# Patient Record
Sex: FEMALE | Race: WHITE | NOT HISPANIC OR LATINO | Employment: PART TIME | URBAN - METROPOLITAN AREA
[De-identification: names, ages, dates, MRNs, and addresses within clinical notes are randomized per-mention and may not be internally consistent; named-entity substitution may affect disease eponyms.]

---

## 2017-03-26 ENCOUNTER — OFFICE VISIT (OUTPATIENT)
Dept: URGENT CARE | Facility: CLINIC | Age: 47
End: 2017-03-26

## 2017-03-26 PROCEDURE — 99203 OFFICE O/P NEW LOW 30 MIN: CPT

## 2022-12-12 ENCOUNTER — OFFICE VISIT (OUTPATIENT)
Dept: FAMILY MEDICINE CLINIC | Facility: CLINIC | Age: 52
End: 2022-12-12

## 2022-12-12 VITALS
HEIGHT: 65 IN | HEART RATE: 73 BPM | SYSTOLIC BLOOD PRESSURE: 164 MMHG | DIASTOLIC BLOOD PRESSURE: 92 MMHG | BODY MASS INDEX: 25.99 KG/M2 | OXYGEN SATURATION: 98 % | WEIGHT: 156 LBS | TEMPERATURE: 96.8 F

## 2022-12-12 DIAGNOSIS — Z12.11 SCREENING FOR COLORECTAL CANCER: ICD-10-CM

## 2022-12-12 DIAGNOSIS — Z13.1 SCREENING FOR DIABETES MELLITUS: ICD-10-CM

## 2022-12-12 DIAGNOSIS — Z13.220 SCREENING FOR LIPID DISORDERS: ICD-10-CM

## 2022-12-12 DIAGNOSIS — Z11.4 SCREENING FOR HIV (HUMAN IMMUNODEFICIENCY VIRUS): ICD-10-CM

## 2022-12-12 DIAGNOSIS — Z12.31 ENCOUNTER FOR SCREENING MAMMOGRAM FOR MALIGNANT NEOPLASM OF BREAST: ICD-10-CM

## 2022-12-12 DIAGNOSIS — Z76.89 ENCOUNTER TO ESTABLISH CARE: Primary | ICD-10-CM

## 2022-12-12 DIAGNOSIS — Z12.12 SCREENING FOR COLORECTAL CANCER: ICD-10-CM

## 2022-12-12 DIAGNOSIS — F43.21 GRIEF AT LOSS OF CHILD: ICD-10-CM

## 2022-12-12 DIAGNOSIS — Z13.29 SCREENING FOR THYROID DISORDER: ICD-10-CM

## 2022-12-12 DIAGNOSIS — Z11.59 NEED FOR HEPATITIS C SCREENING TEST: ICD-10-CM

## 2022-12-12 DIAGNOSIS — Z23 ENCOUNTER FOR IMMUNIZATION: ICD-10-CM

## 2022-12-12 DIAGNOSIS — I73.00 RAYNAUD'S PHENOMENON WITHOUT GANGRENE: ICD-10-CM

## 2022-12-12 DIAGNOSIS — Z13.0 SCREENING FOR DEFICIENCY ANEMIA: ICD-10-CM

## 2022-12-12 DIAGNOSIS — Z13.31 POSITIVE DEPRESSION SCREENING: ICD-10-CM

## 2022-12-12 DIAGNOSIS — F33.1 MODERATE EPISODE OF RECURRENT MAJOR DEPRESSIVE DISORDER (HCC): ICD-10-CM

## 2022-12-12 DIAGNOSIS — Z63.4 GRIEF AT LOSS OF CHILD: ICD-10-CM

## 2022-12-12 SDOH — SOCIAL STABILITY - SOCIAL INSECURITY: DISSAPEARANCE AND DEATH OF FAMILY MEMBER: Z63.4

## 2022-12-12 NOTE — PATIENT INSTRUCTIONS
Blood Pressure Diary    Check blood pressures at home and keep a log  Bring log to your follow up or call if the average home BP is greater than 748 systolic and or 90 diastolic before your follow up  Prasanna Landaverde   Tues  Wed Thurs Fri Sat  COMMENTS                                                                            Keep a log of your blood pressure readings at home  Please take blood pressure at same time of day  Please record date and time blood pressure was taken  Make sure to take your blood pressure when you are seated and resting for about 3 minutes  Please call office if Blood Pressure is <110/<60  You can send these to me via Gnodal or by calling the office  This will help me manage your blood pressure better  Apps and Websites for Counseling, Anxiety/Depression, Chronic Pain, Lifestyle Change, Problem-solving, Self-Esteem, Anger Management, Coping with Uncertainty    [address] [facility]: [phone]    [facility]: [phone]    Greg Lewis and [address] [place] [address]: [phone]    [employer] Phone Line: [phone] [address]: [phone] or [phone]    (Prices current as of 9/5/19)    Mood Kit - Available in [facility] for $4 99  Supports a person's success in specific situations, includes thought , mood tracker, and journal   Can be accessed in an unstructured way and used as an unguided self-help abdulkadir  2   Moodnotes - Available in [employer] for $4 99  Focuses on healthier thinking habits and identifying "traps" in thinking  Tracks mood over a period of time and identifies factors that influence mood  3   MoodMission - Available in [facility] for free  Includes five "missions" to promote confidence in handling stressors and coping in specific situations  The abdulkadir personalizes its style and techniques based on when the user engages it most frequently    In-abdulkadir rewards are used to motivate, increase fun and self-confidence  Helpful for patients who need improvement in mood or a decrease in anxiety and depression symptoms  4    What's Up - Available in [employer] for free  Recognizes negative thinking patterns and methods to overcome them  Uses helpful metaphors, a catastrophe scale, grounding techniques, and breathing exercises  Has the ability to sync data across multiple devices and protect this information with a unique pass code  Has the capability to be active in forums where people can discuss similar feelings and strategies that have been helpful  5   Moodpath - Available in [employer] for free  Uses daily screenings to create a better understanding of thoughts, feelings, and emotions  When needed, it provides a discussion guide to talking with a medical professional based on the responses to daily screenings  Includes over 150 psychological exercises and videos to promote and strengthen overall mental health  6   MindShift - Available in [employer] for [employer]IR[employer]IC  Helpful for youth and young adults in coping with anxiety  Creates an individualized toolbox to help users deal with test anxiety, perfectionism, social anxiety, worry, panic, and conflict  Includes directions on how to construct “belief experiments” to trial common beliefs that feed anxiety, guided relaxation, as well as tools and tips to help establish and accomplish goals  Differentiates between helpful and unhelpful anxiety, and explains how to overcome fears by gradually facing them in manageable steps  7   CBT-I  - Available in [employer]  For insomnia  Educates about sleep, developing positive sleep routines, and improved sleep environment  Encourages user to change behaviors which will provide confidence for better sleep on a regular basis  8   Yipit  - Free website  Provides self-help and therapy resources that encourages change to combat negative and destructive thought patterns  Includes access to numerous handouts on a variety of symptoms related to anxiety, depression, low self-esteem, panic attacks, social disorders, and more  The solution section has interventions that can be printed and saved for future use  9   Woebot - Available in [employer] for free  Recommended for age 16+  Friendly self-care  that helps you think through situations with step-by-step guidance using counseling tools, learn about yourself with intelligent mood tracking, and master skills to reduce stress and live happier through 100+ evidence-based stories from clinicians  Chat as often or as little as you'd like, whenever you'd like to  10   Mark:  EULALIA  CBT DBT Chatbot - Available in Apple appsstore and Google Play for free, has in-abdulkadir purchases  Recommended for 12+  Psychologist support at $30/month, 50% off to start  Justyn Christiansen / [employer] is free  Uses techniques of CBT, DBT, yoga, and meditation to support your needs regarding stress, anxiety, sleep, loss, and a full range of other mental health and wellness issues  11   Curable Pain Relief - Available in [employer] Abdulkadir store and Google Play for free, has in-abdulkadir purchases  Teaches about the chronic pain cycle and how to reverse it, while retraining your brain and nervous system for long-term results  Smart  Sarah guides user through updates in pain science to identify, target, eliminate the factors that are keeping user "stuck" in the pain cycle  12   [facility] - Available in [employer] Abd[employer]adir store and Google Play for a fee  Subscription service, starting at $59/week (billed monthly)  All plans include unlimited messaging, and add live video sessions if desired  Unlimited messaging therapy for teens ages 15-14 and special services for couples therapy    You can change therapists or stop subscription renewal at any time  Recommended for 13+  User is matched with a licensed therapist in your state and communicate on your device by text, audio, and video from anywhere, at any time  User will hear back at least once a day, 5 days per week

## 2022-12-12 NOTE — PROGRESS NOTES
Assessment/Plan:    Problem List Items Addressed This Visit     Raynaud's phenomenon without gangrene   Other Visit Diagnoses     Encounter to establish care    -  Primary    Encounter for immunization        Relevant Orders    Tdap Vaccine greater than or equal to 6yo (Completed)    Grief at loss of child        Relevant Orders    Ambulatory Referral to Psychology    Moderate episode of recurrent major depressive disorder ([address] )        Relevant Medications    sertraline (ZOLOFT) 50 mg tablet    Screening for deficiency anemia        Relevant Orders    CBC and differential    Screening for thyroid disorder        Relevant Orders    TSH, 3rd generation with Free T4 reflex    Screening for lipid disorders        Relevant Orders    Lipid Panel with Direct LDL reflex    Screening for diabetes mellitus        Relevant Orders    Comprehensive metabolic panel    Screening for colorectal cancer        Relevant Orders    Ambulatory referral for colonoscopy    Encounter for screening mammogram for malignant neoplasm of breast        Relevant Orders    Mammo screening bilateral w 3d & cad    Need for hepatitis C screening test        Relevant Orders    Hepatitis C antibody    Screening for HIV (human immunodeficiency virus)        Relevant Orders    HIV 1/2 Antigen/Antibody (4th Generation) w Reflex [facility]    Positive depression screening        Relevant Orders    Ambulatory Referral to Psychology           Diagnoses and all orders for this visit:    Encounter to establish care    Encounter for immunization  -     Tdap Vaccine greater than or equal to 6yo    Grief at loss of child  -     Ambulatory Referral to Psychology; Future    Moderate episode of recurrent major depressive disorder (HCC)  -     sertraline (ZOLOFT) 50 mg tablet;  Take 1 tablet (50 mg total) by mouth daily    Screening for deficiency anemia  -     CBC and differential; Future    Screening for thyroid disorder  -     TSH, 3rd generation with Free T4 reflex; Future    Screening for lipid disorders  -     Lipid Panel with Direct LDL reflex; Future    Screening for diabetes mellitus  -     Comprehensive metabolic panel; Future    Screening for colorectal cancer  -     Ambulatory referral for colonoscopy; Future    Encounter for screening mammogram for malignant neoplasm of breast  -     Mammo screening bilateral w 3d & cad; Future    Need for hepatitis C screening test  -     Hepatitis C antibody; Future    Screening for HIV (human immunodeficiency virus)  -     HIV 1/2 Antigen/Antibody (4th Generation) w Reflex [facility]; Future    Raynaud's phenomenon without gangrene    Positive depression screening  -     Ambulatory Referral to Psychology; Future      No problem-specific Assessment & Plan notes found for this encounter  Subjective:      Patient ID: Tarik Terrazas is a 47 y o  female  NP with PMHx raynaud's syndrome presents to establish care  PHQ is elevated, Pt states she lost her son 6-8 years ago and in July lost her father  She endorses she is easily irritated, cries often, and difficulty concentrating at times  Pt BP is noted to be elevated  Pt will record BP at home and if it doesn't trend down, she will advise and is agreeable to starting BP lowering agent  Depression  This is a chronic problem  Associated symptoms comments: Crying, easily upset, irritable,   The symptoms are aggravated by stress  She has tried nothing for the symptoms  The treatment provided no relief  The following portions of the patient's history were reviewed and updated as appropriate:   She has no past medical history on file  ,  does not have any pertinent problems on file  ,   has a past surgical history that includes  section  ,  family history includes Breast cancer in her mother; Depression in her mother; Heart attack in her father; Heart disease in her father; Hyperlipidemia in her father; Hypertension in her father and mother; Kidney disease in her father; Stroke in her father; Uterine cancer in her mother  ,   reports that she has been smoking cigarettes  She started smoking about 32 years ago  She has a 30 00 pack-year smoking history  She has never used smokeless tobacco  She reports current alcohol use  She reports that she does not use drugs  ,  has No Known Allergies     Current Outpatient Medications   Medication Sig Dispense Refill   • sertraline (ZOLOFT) 50 mg tablet Take 1 tablet (50 mg total) by mouth daily 30 tablet 1     No current facility-administered medications for this visit  BMI Counseling: Body mass index is 25 96 kg/m²  The BMI is above normal  Nutrition recommendations include decreasing portion sizes, consuming healthier snacks, limiting drinks that contain sugar, moderation in carbohydrate intake, increasing intake of lean protein, reducing intake of saturated and trans fat and reducing intake of cholesterol  Exercise recommendations include exercising 3-5 times per week  No pharmacotherapy was ordered  Rationale for BMI follow-up plan is due to patient being overweight or obese  Depression Screening and Follow-up Plan: Patient's depression screening was positive with a PHQ-2 score of 6  Their PHQ-9 score was 17  Patient assessed for underlying major depression  Brief counseling provided and recommend additional follow-up/re-evaluation next office visit  Continue regular follow-up with their mental health provider who is managing their mental health condition(s)  Has been going on for 6-8 years due to losing son 6 years ago and father this past July    Tobacco Cessation Counseling: Tobacco cessation counseling was provided  The patient is sincerely urged to quit consumption of tobacco  She is not ready to quit tobacco  Medication options and side effects of medication discussed  Patient refused medication  Review of Systems   Psychiatric/Behavioral: Positive for agitation, decreased concentration and depression   Negative for suicidal ideas  Easily irritated   All other systems reviewed and are negative  Objective:  Vitals:    12/12/22 0918 12/12/22 0953   BP: 170/98 164/92   BP Location: Left arm    Patient Position: Sitting    Cuff Size: Adult    Pulse: 73    Temp: (!) 96 8 °F (36 °C)    TempSrc: Tympanic    SpO2: 98%    Weight: 70 8 kg (156 lb)    Height: 5' 5" (1 651 m)      Body mass index is 25 96 kg/m²  Physical Exam  Vitals and nursing note reviewed  Constitutional:       Appearance: Normal appearance  She is well-developed  Interventions: Face mask in place  HENT:      Head: Normocephalic and atraumatic  Right Ear: Tympanic membrane, ear canal and external ear normal       Left Ear: Tympanic membrane, ear canal and external ear normal       Nose: Nose normal       Mouth/Throat:      Mouth: Mucous membranes are moist       Pharynx: Uvula midline  Eyes:      General: Lids are normal       Conjunctiva/sclera: Conjunctivae normal       Pupils: Pupils are equal, round, and reactive to light  Neck:      Thyroid: No thyroid mass or thyromegaly  Vascular: No JVD  Trachea: Trachea and phonation normal    Cardiovascular:      Rate and Rhythm: Normal rate and regular rhythm  Pulses: Normal pulses  Heart sounds: Normal heart sounds, S1 normal and S2 normal  No murmur heard  No friction rub  No gallop  Pulmonary:      Effort: Pulmonary effort is normal       Breath sounds: Normal breath sounds  Abdominal:      General: Bowel sounds are normal       Palpations: Abdomen is soft  Tenderness: There is no abdominal tenderness  Genitourinary:     Comments: Deferred   Musculoskeletal:         General: Normal range of motion  Cervical back: Full passive range of motion without pain, normal range of motion and neck supple  Right lower leg: No edema  Left lower leg: No edema     Lymphadenopathy:      Head:      Right side of head: No submental, submandibular, tonsillar, preauricular, posterior auricular or occipital adenopathy  Left side of head: No submental, submandibular, tonsillar, preauricular, posterior auricular or occipital adenopathy  Cervical: No cervical adenopathy  Skin:     General: Skin is warm and dry  Capillary Refill: Capillary refill takes less than 2 seconds  Neurological:      General: No focal deficit present  Mental Status: She is alert and oriented to person, place, and time  Cranial Nerves: No cranial nerve deficit  Sensory: Sensation is intact  Motor: Motor function is intact  Coordination: Coordination is intact  Gait: Gait is intact  Deep Tendon Reflexes: Reflexes are normal and symmetric  Psychiatric:         Attention and Perception: Attention and perception normal          Mood and Affect: Mood and affect normal          Speech: Speech normal          Behavior: Behavior normal  Behavior is cooperative  Thought Content:  Thought content normal          Cognition and Memory: Cognition normal          Judgment: Judgment normal

## 2023-01-10 ENCOUNTER — APPOINTMENT (OUTPATIENT)
Dept: LAB | Facility: CLINIC | Age: 53
End: 2023-01-10

## 2023-01-10 DIAGNOSIS — Z11.4 SCREENING FOR HIV (HUMAN IMMUNODEFICIENCY VIRUS): ICD-10-CM

## 2023-01-10 DIAGNOSIS — Z13.0 SCREENING FOR DEFICIENCY ANEMIA: ICD-10-CM

## 2023-01-10 DIAGNOSIS — Z13.29 SCREENING FOR THYROID DISORDER: ICD-10-CM

## 2023-01-10 DIAGNOSIS — Z11.59 NEED FOR HEPATITIS C SCREENING TEST: ICD-10-CM

## 2023-01-10 DIAGNOSIS — Z13.220 SCREENING FOR LIPID DISORDERS: ICD-10-CM

## 2023-01-10 DIAGNOSIS — Z13.1 SCREENING FOR DIABETES MELLITUS: ICD-10-CM

## 2023-01-10 LAB
ALBUMIN SERPL BCP-MCNC: 3.7 G/DL (ref 3.5–5)
ALP SERPL-CCNC: 63 U/L (ref 46–116)
ALT SERPL W P-5'-P-CCNC: 17 U/L (ref 12–78)
ANION GAP SERPL CALCULATED.3IONS-SCNC: 3 MMOL/L (ref 4–13)
AST SERPL W P-5'-P-CCNC: 9 U/L (ref 5–45)
BASOPHILS # BLD AUTO: 0.06 THOUSANDS/ÂΜL (ref 0–0.1)
BASOPHILS NFR BLD AUTO: 1 % (ref 0–1)
BILIRUB SERPL-MCNC: 0.54 MG/DL (ref 0.2–1)
BUN SERPL-MCNC: 7 MG/DL (ref 5–25)
CALCIUM SERPL-MCNC: 9 MG/DL (ref 8.3–10.1)
CHLORIDE SERPL-SCNC: 109 MMOL/L (ref 96–108)
CHOLEST SERPL-MCNC: 159 MG/DL
CO2 SERPL-SCNC: 28 MMOL/L (ref 21–32)
CREAT SERPL-MCNC: 0.7 MG/DL (ref 0.6–1.3)
EOSINOPHIL # BLD AUTO: 0.17 THOUSAND/ÂΜL (ref 0–0.61)
EOSINOPHIL NFR BLD AUTO: 2 % (ref 0–6)
ERYTHROCYTE [DISTWIDTH] IN BLOOD BY AUTOMATED COUNT: 13.4 % (ref 11.6–15.1)
GFR SERPL CREATININE-BSD FRML MDRD: 100 ML/MIN/1.73SQ M
GLUCOSE P FAST SERPL-MCNC: 86 MG/DL (ref 65–99)
HCT VFR BLD AUTO: 47.5 % (ref 34.8–46.1)
HCV AB SER QL: NORMAL
HDLC SERPL-MCNC: 31 MG/DL
HGB BLD-MCNC: 14.8 G/DL (ref 11.5–15.4)
IMM GRANULOCYTES # BLD AUTO: 0.02 THOUSAND/UL (ref 0–0.2)
IMM GRANULOCYTES NFR BLD AUTO: 0 % (ref 0–2)
LDLC SERPL CALC-MCNC: 102 MG/DL (ref 0–100)
LYMPHOCYTES # BLD AUTO: 2.19 THOUSANDS/ÂΜL (ref 0.6–4.47)
LYMPHOCYTES NFR BLD AUTO: 29 % (ref 14–44)
MCH RBC QN AUTO: 29.3 PG (ref 26.8–34.3)
MCHC RBC AUTO-ENTMCNC: 31.2 G/DL (ref 31.4–37.4)
MCV RBC AUTO: 94 FL (ref 82–98)
MONOCYTES # BLD AUTO: 0.51 THOUSAND/ÂΜL (ref 0.17–1.22)
MONOCYTES NFR BLD AUTO: 7 % (ref 4–12)
NEUTROPHILS # BLD AUTO: 4.62 THOUSANDS/ÂΜL (ref 1.85–7.62)
NEUTS SEG NFR BLD AUTO: 61 % (ref 43–75)
NRBC BLD AUTO-RTO: 0 /100 WBCS
PLATELET # BLD AUTO: 333 THOUSANDS/UL (ref 149–390)
PMV BLD AUTO: 9.8 FL (ref 8.9–12.7)
POTASSIUM SERPL-SCNC: 4.1 MMOL/L (ref 3.5–5.3)
PROT SERPL-MCNC: 6.7 G/DL (ref 6.4–8.4)
RBC # BLD AUTO: 5.05 MILLION/UL (ref 3.81–5.12)
SODIUM SERPL-SCNC: 140 MMOL/L (ref 135–147)
TRIGL SERPL-MCNC: 132 MG/DL
TSH SERPL DL<=0.05 MIU/L-ACNC: 0.9 UIU/ML (ref 0.45–4.5)
WBC # BLD AUTO: 7.57 THOUSAND/UL (ref 4.31–10.16)

## 2023-01-11 LAB — HIV 1+2 AB+HIV1 P24 AG SERPL QL IA: NORMAL

## 2023-01-17 ENCOUNTER — OFFICE VISIT (OUTPATIENT)
Dept: FAMILY MEDICINE CLINIC | Facility: CLINIC | Age: 53
End: 2023-01-17

## 2023-01-17 VITALS
WEIGHT: 152 LBS | HEIGHT: 65 IN | OXYGEN SATURATION: 98 % | SYSTOLIC BLOOD PRESSURE: 154 MMHG | HEART RATE: 78 BPM | DIASTOLIC BLOOD PRESSURE: 92 MMHG | BODY MASS INDEX: 25.33 KG/M2 | TEMPERATURE: 96.4 F

## 2023-01-17 DIAGNOSIS — Z12.2 ENCOUNTER FOR SCREENING FOR LUNG CANCER: ICD-10-CM

## 2023-01-17 DIAGNOSIS — Z00.01 ABNORMAL WELLNESS EXAM: Primary | ICD-10-CM

## 2023-01-17 DIAGNOSIS — Z12.31 ENCOUNTER FOR SCREENING MAMMOGRAM FOR MALIGNANT NEOPLASM OF BREAST: ICD-10-CM

## 2023-01-17 DIAGNOSIS — F17.210 SMOKING GREATER THAN 20 PACK YEARS: ICD-10-CM

## 2023-01-17 DIAGNOSIS — Z12.11 SCREEN FOR COLON CANCER: ICD-10-CM

## 2023-01-17 DIAGNOSIS — I10 PRIMARY HYPERTENSION: ICD-10-CM

## 2023-01-17 DIAGNOSIS — F33.1 MODERATE EPISODE OF RECURRENT MAJOR DEPRESSIVE DISORDER (HCC): ICD-10-CM

## 2023-01-17 RX ORDER — OLMESARTAN MEDOXOMIL 5 MG/1
5 TABLET ORAL DAILY
Qty: 30 TABLET | Refills: 1 | Status: SHIPPED | OUTPATIENT
Start: 2023-01-17

## 2023-01-17 NOTE — PROGRESS NOTES
[facility]    NAME: Brigid Galindo  AGE: 47 y o  SEX: female  : 1970     DATE: 2023     Assessment and Plan:     Problem List Items Addressed This Visit     Smoking greater than 20 pack years    Relevant Orders    CT lung screening program    Primary hypertension    Relevant Medications    olmesartan (BENICAR) 5 mg tablet    Moderate episode of recurrent major depressive disorder (HCC)    Relevant Medications    sertraline (ZOLOFT) 50 mg tablet   Other Visit Diagnoses     Abnormal wellness exam    -  Primary    Encounter for screening for lung cancer        Relevant Orders    CT lung screening program    Encounter for screening mammogram for malignant neoplasm of breast        Screen for colon cancer        Relevant Orders    Ambulatory referral for colonoscopy          Immunizations and preventive care screenings were discussed with patient today  Appropriate education was printed on patient's after visit summary  Counseling:  Alcohol/drug use: discussed moderation in alcohol intake, the recommendations for healthy alcohol use, and avoidance of illicit drug use  Dental Health: discussed importance of regular tooth brushing, flossing, and dental visits  Injury prevention: discussed safety/seat belts, safety helmets, smoke detectors, carbon dioxide detectors, and smoking near bedding or upholstery  Sexual health: discussed sexually transmitted diseases, partner selection, use of condoms, avoidance of unintended pregnancy, and contraceptive alternatives  Exercise: the importance of regular exercise/physical activity was discussed  Recommend exercise 3-5 times per week for at least 30 minutes  BMI Counseling: Body mass index is 25 29 kg/m²   The BMI is above normal  Nutrition recommendations include decreasing portion sizes, consuming healthier snacks, limiting drinks that contain sugar, moderation in carbohydrate intake, increasing intake of lean protein, reducing intake of saturated and trans fat and reducing intake of cholesterol  Exercise recommendations include exercising 3-5 times per week  No pharmacotherapy was ordered  Rationale for BMI follow-up plan is due to patient being overweight or obese  Return in about 1 month (around 2/17/2023)  Chief Complaint:     Chief Complaint   Patient presents with   • Physical Exam     annual      History of Present Illness:     Adult Annual Physical   Patient here for a comprehensive physical exam  The patient reports no problems  Hypertension  This is a chronic problem  The problem is uncontrolled  There are no associated agents to hypertension  Risk factors for coronary artery disease include post-menopausal state  Past treatments include angiotensin blockers (start)  There is no history of angina or CAD/MI  There is no history of chronic renal disease, hyperaldosteronism, hyperparathyroidism, a hypertension causing med, renovascular disease or a thyroid problem  Depression  This is a chronic problem  The symptoms are aggravated by stress  Treatments tried: started sertraline 12/2022  The treatment provided moderate relief  Diet and Physical Activity  Diet/Nutrition: well balanced diet  Exercise: no formal exercise  Depression Screening  PHQ-2/9 Depression Screening         General Health  Sleep: gets 7-8 hours of sleep on average  Hearing: normal - bilateral   Vision: no vision problems and most recent eye exam >1 year ago  Dental: no dental visits for >1 year  /GYN Health  Patient is: postmenopausal  Last menstrual period: 2022  Review of Systems:     Review of Systems   Psychiatric/Behavioral: Positive for depression  All other systems reviewed and are negative  Past Medical History:     History reviewed  No pertinent past medical history     Past Surgical History:     Past Surgical History:   Procedure Laterality Date   •  SECTION      2-  and       Social History:     Social History     Socioeconomic History   • Marital status: Single     Spouse name: None   • Number of children: None   • Years of education: None   • Highest education level: None   Occupational History   • None   Tobacco Use   • Smoking status: Every Day     Packs/day: 1 00     Years: 30 00     Pack years: 30 00     Types: Cigarettes     Start date:    • Smokeless tobacco: Never   Vaping Use   • Vaping Use: Never used   Substance and Sexual Activity   • Alcohol use: Yes     Comment: socially-very rare   • Drug use: Never   • Sexual activity: None   Other Topics Concern   • None   Social History Narrative   • None     Social Determinants of Health     Financial Resource Strain: Not on file   Food Insecurity: Not on file   Transportation Needs: Not on file   Physical Activity: Not on file   Stress: Not on file   Social Connections: Not on file   Intimate Partner Violence: Not on file   Housing Stability: Not on file      Family History:     Family History   Problem Relation Age of Onset   • Hypertension Mother    • Depression Mother    • Breast cancer Mother    • Uterine cancer Mother    • Hyperlipidemia Father    • Hypertension Father    • Kidney disease Father    • Heart attack Father    • Stroke Father    • Heart disease Father       Current Medications:     Current Outpatient Medications   Medication Sig Dispense Refill   • olmesartan (BENICAR) 5 mg tablet Take 1 tablet (5 mg total) by mouth daily 30 tablet 1   • sertraline (ZOLOFT) 50 mg tablet Take 1 tablet (50 mg total) by mouth daily 90 tablet 1     No current facility-administered medications for this visit        Allergies:     No Known Allergies   Physical Exam:     /92 (BP Location: Left arm, Patient Position: Sitting, Cuff Size: Adult)   Pulse 78   Temp (!) 96 4 °F (35 8 °C) (Tympanic)   Ht 5' 5" (1 651 m)   Wt 68 9 kg (152 lb)   SpO2 98%   BMI 25 29 kg/m² Physical Exam  Vitals and nursing note reviewed  Constitutional:       Appearance: Normal appearance  She is well-developed  HENT:      Head: Normocephalic and atraumatic  Right Ear: Tympanic membrane, ear canal and external ear normal       Left Ear: Tympanic membrane, ear canal and external ear normal       Nose: Nose normal       Mouth/Throat:      Mouth: Mucous membranes are moist    Eyes:      General: Lids are normal  Vision grossly intact  Conjunctiva/sclera: Conjunctivae normal       Pupils: Pupils are equal, round, and reactive to light  Cardiovascular:      Rate and Rhythm: Normal rate and regular rhythm  Pulses: Normal pulses  Heart sounds: Normal heart sounds, S1 normal and S2 normal      No friction rub  No gallop  No S3 sounds  Pulmonary:      Effort: Pulmonary effort is normal       Breath sounds: Normal breath sounds  Abdominal:      General: Bowel sounds are normal       Palpations: Abdomen is soft  Tenderness: There is no abdominal tenderness  Genitourinary:     Comments: Deferred  Musculoskeletal:         General: Normal range of motion  Cervical back: Normal range of motion and neck supple  Right lower leg: No edema  Left lower leg: No edema  Lymphadenopathy:      Cervical: No cervical adenopathy  Skin:     General: Skin is warm and dry  Capillary Refill: Capillary refill takes less than 2 seconds  Neurological:      General: No focal deficit present  Mental Status: She is alert and oriented to person, place, and time  Motor: Motor function is intact  Gait: Gait is intact  Psychiatric:         Attention and Perception: Attention and perception normal          Mood and Affect: Mood and affect normal          Speech: Speech normal          Behavior: Behavior normal  Behavior is cooperative  Thought Content:  Thought content normal          Cognition and Memory: Cognition and memory normal          Judgment: Judgment normal           Appointment on 01/10/2023   Component Date Value Ref Range Status   • Cholesterol 01/10/2023 159  See Comment mg/dL Final    Cholesterol:         Pediatric <18 Years        Desirable          <170 mg/dL      Borderline High    170-199 mg/dL      High               >=200 mg/dL        Adult >=18 Years            Desirable         <200 mg/dL      Borderline High   200-239 mg/dL      High              >239 mg/dL     • Triglycerides 01/10/2023 132  See Comment mg/dL Final    Triglyceride:     0-9Y            <75mg/dL     10Y-17Y         <90 mg/dL       >=18Y     Normal          <150 mg/dL     Borderline High 150-199 mg/dL     High            200-499 mg/dL        Very High       >499 mg/dL    Specimen collection should occur prior to N-Acetylcysteine or Metamizole administration due to the potential for falsely depressed results  • HDL, Direct 01/10/2023 31 (L)  >=50 mg/dL Final   • LDL Calculated 01/10/2023 102 (H)  0 - 100 mg/dL Final    This screening LDL is a calculated result  It does not have the accuracy of the Direct Measured LDL in the monitoring of patients with hyperlipidemia and/or statin therapy  Direct Measure LDL (INZ901) must be ordered separately in these patients    LDL Cholesterol:     Optimal           <100 mg/dl     Near Optimal      100-129 mg/dl     Above Optimal       Borderline High 130-159 mg/dl       High            160-189 mg/dl       Very High       >189 mg/dl          • WBC 01/10/2023 7 57  4 31 - 10 16 Thousand/uL Final   • RBC 01/10/2023 5 05  3 81 - 5 12 Million/uL Final   • Hemoglobin 01/10/2023 14 8  11 5 - 15 4 g/dL Final   • Hematocrit 01/10/2023 47 5 (H)  34 8 - 46 1 % Final   • MCV 01/10/2023 94  82 - 98 fL Final   • MCH 01/10/2023 29 3  26 8 - 34 3 pg Final   • MCHC 01/10/2023 31 2 (L)  31 4 - 37 4 g/dL Final   • RDW 01/10/2023 13 4  11 6 - 15 1 % Final   • MPV 01/10/2023 9 8  8 9 - 12 7 fL Final   • Platelets 93/39/8394 333  149 - 390 Thousands/uL Final   • nRBC 01/10/2023 0  /100 WBCs Final   • Neutrophils Relative 01/10/2023 61  43 - 75 % Final   • Immat GRANS % 01/10/2023 0  0 - 2 % Final   • Lymphocytes Relative 01/10/2023 29  14 - 44 % Final   • Monocytes Relative 01/10/2023 7  4 - 12 % Final   • Eosinophils Relative 01/10/2023 2  0 - 6 % Final   • Basophils Relative 01/10/2023 1  0 - 1 % Final   • Neutrophils Absolute 01/10/2023 4 62  1 85 - 7 62 Thousands/µL Final   • Immature Grans Absolute 01/10/2023 0 02  0 00 - 0 20 Thousand/uL Final   • Lymphocytes Absolute 01/10/2023 2 19  0 60 - 4 47 Thousands/µL Final   • Monocytes Absolute 01/10/2023 0 51  0 17 - 1 22 Thousand/µL Final   • Eosinophils Absolute 01/10/2023 0 17  0 00 - 0 61 Thousand/µL Final   • Basophils Absolute 01/10/2023 0 06  0 00 - 0 10 Thousands/µL Final   • Sodium 01/10/2023 140  135 - 147 mmol/L Final   • Potassium 01/10/2023 4 1  3 5 - 5 3 mmol/L Final   • Chloride 01/10/2023 109 (H)  96 - 108 mmol/L Final   • CO2 01/10/2023 28  21 - 32 mmol/L Final   • ANION GAP 01/10/2023 3 (L)  4 - 13 mmol/L Final   • BUN 01/10/2023 7  5 - 25 mg/dL Final   • Creatinine 01/10/2023 0 70  0 60 - 1 30 mg/dL Final    Standardized to IDMS reference method   • Glucose, Fasting 01/10/2023 86  65 - 99 mg/dL Final    Specimen collection should occur prior to Sulfasalazine administration due to the potential for falsely depressed results  Specimen collection should occur prior to Sulfapyridine administration due to the potential for falsely elevated results  • Calcium 01/10/2023 9 0  8 3 - 10 1 mg/dL Final   • AST 01/10/2023 9  5 - 45 U/L Final    Specimen collection should occur prior to Sulfasalazine administration due to the potential for falsely depressed results  • ALT 01/10/2023 17  12 - 78 U/L Final    Specimen collection should occur prior to Sulfasalazine and/or Sulfapyridine administration due to the potential for falsely depressed results      • Alkaline Phosphatase 01/10/2023 63  46 - 116 U/L Final   • Total Protein 01/10/2023 6 7  6 4 - 8 4 g/dL Final   • Albumin 01/10/2023 3 7  3 5 - 5 0 g/dL Final   • Total Bilirubin 01/10/2023 0 54  0 20 - 1 00 mg/dL Final    Use of this assay is not recommended for patients undergoing treatment with eltrombopag due to the potential for falsely elevated results  • eGFR 01/10/2023 100  ml/min/1 73sq m Final   • TSH 3RD GENERATON 01/10/2023 0 904  0 450 - 4 500 uIU/mL Final    The recommended reference ranges for TSH during pregnancy are as follows:   First trimester 0 1 to 2 5 uIU/mL   Second trimester  0 2 to 3 0 uIU/mL   Third trimester 0 3 to 3 0 uIU/m    Note: Normal ranges may not apply to patients who are transgender, non-binary, or whose legal sex, sex at birth, and gender identity differ  Adult TSH (3rd generation) reference range follows the recommended guidelines of the [employer] Association, January, 2020  • Hepatitis C Ab 01/10/2023 Non-reactive  Non-reactive Final   • HIV-1/HIV-2 Ab 01/10/2023 Non-Reactive  Non-Reactive Final     I have reviewed all the lab results  There are some abnormalities that are not critical to the patient's health, I have discussed these in person at this office appointment      Charlie Finch [address] [place] [address]

## 2023-02-20 ENCOUNTER — OFFICE VISIT (OUTPATIENT)
Dept: FAMILY MEDICINE CLINIC | Facility: CLINIC | Age: 53
End: 2023-02-20

## 2023-02-20 VITALS
OXYGEN SATURATION: 98 % | HEART RATE: 77 BPM | BODY MASS INDEX: 25.16 KG/M2 | TEMPERATURE: 96.8 F | DIASTOLIC BLOOD PRESSURE: 96 MMHG | SYSTOLIC BLOOD PRESSURE: 158 MMHG | HEIGHT: 65 IN | WEIGHT: 151 LBS

## 2023-02-20 DIAGNOSIS — Z23 NEED FOR PNEUMOCOCCAL VACCINE: ICD-10-CM

## 2023-02-20 DIAGNOSIS — Z23 NEED FOR SHINGLES VACCINE: ICD-10-CM

## 2023-02-20 DIAGNOSIS — I10 PRIMARY HYPERTENSION: Primary | ICD-10-CM

## 2023-02-20 RX ORDER — IRBESARTAN 75 MG/1
75 TABLET ORAL
Qty: 30 TABLET | Refills: 1 | Status: SHIPPED | OUTPATIENT
Start: 2023-02-20

## 2023-02-20 NOTE — PROGRESS NOTES
Assessment/Plan:    Problem List Items Addressed This Visit     Primary hypertension - Primary    Relevant Medications    irbesartan (AVAPRO) 75 mg tablet   Other Visit Diagnoses     Need for shingles vaccine        Relevant Orders    Zoster Vaccine Recombinant IM    Need for pneumococcal vaccine        Relevant Orders    Pneumococcal Conjugate Vaccine 20-valent (Pcv20)           Diagnoses and all orders for this visit:    Primary hypertension  -     irbesartan (AVAPRO) 75 mg tablet; Take 1 tablet (75 mg total) by mouth daily at bedtime    Need for shingles vaccine  -     Zoster Vaccine Recombinant IM    Need for pneumococcal vaccine  -     Pneumococcal Conjugate Vaccine 20-valent (Pcv20)      No problem-specific Assessment & Plan notes found for this encounter  Subjective:      Patient ID: William Anderson is a 47 y o  female  Hypertension  This is a chronic problem  The problem is uncontrolled  Pertinent negatives include no anxiety, chest pain, headaches, orthopnea, palpitations, peripheral edema, PND or shortness of breath  There are no associated agents to hypertension  Risk factors for coronary artery disease include smoking/tobacco exposure  Past treatments include angiotensin blockers  The current treatment provides no improvement (change from benicar to avapro and recheck BP in 1 month)  There are no compliance problems  There is no history of angina or heart failure  There is no history of chronic renal disease, hyperaldosteronism, hyperparathyroidism, a hypertension causing med, renovascular disease or a thyroid problem  The following portions of the patient's history were reviewed and updated as appropriate:   She has no past medical history on file  ,  does not have any pertinent problems on file  ,   has a past surgical history that includes  section  ,  family history includes Breast cancer in her mother; Depression in her mother; Heart attack in her father; Heart disease in her father; Hyperlipidemia in her father; Hypertension in her father and mother; Kidney disease in her father; Stroke in her father; Uterine cancer in her mother  ,   reports that she has been smoking cigarettes  She started smoking about 33 years ago  She has a 30 00 pack-year smoking history  She has never used smokeless tobacco  She reports current alcohol use  She reports that she does not use drugs  ,  has No Known Allergies     Current Outpatient Medications   Medication Sig Dispense Refill   • irbesartan (AVAPRO) 75 mg tablet Take 1 tablet (75 mg total) by mouth daily at bedtime 30 tablet 1   • sertraline (ZOLOFT) 50 mg tablet Take 1 tablet (50 mg total) by mouth daily 90 tablet 1     No current facility-administered medications for this visit  Review of Systems   Respiratory: Negative for shortness of breath  Cardiovascular: Negative for chest pain, palpitations, orthopnea and PND  Neurological: Negative for headaches  All other systems reviewed and are negative  Objective:  Vitals:    02/20/23 0802   BP: 158/96   BP Location: Left arm   Patient Position: Sitting   Cuff Size: Adult   Pulse: 77   Temp: (!) 96 8 °F (36 °C)   TempSrc: Tympanic   SpO2: 98%   Weight: 68 5 kg (151 lb)   Height: 5' 5" (1 651 m)     Body mass index is 25 13 kg/m²  Physical Exam  Vitals and nursing note reviewed  Constitutional:       Appearance: Normal appearance  Cardiovascular:      Rate and Rhythm: Normal rate  Pulses: Normal pulses  Pulmonary:      Effort: Pulmonary effort is normal    Skin:     Capillary Refill: Capillary refill takes less than 2 seconds  Neurological:      General: No focal deficit present  Mental Status: She is alert  Psychiatric:         Mood and Affect: Mood normal            Portions of the record may have been created with voice recognition software   Occasional wrong word or "sound a like" substitutions may have occurred due to the inherent limitations of voice recognition software  Read the chart carefully and recognize, using context, where substitutions have occurred  Contact me with any questions

## 2023-02-20 NOTE — PATIENT INSTRUCTIONS
Blood Pressure Diary    Check blood pressures at home and keep a log  Bring log to your follow up or call if the average home BP is greater than 722 systolic and or 90 diastolic before your follow up  Malissa Foster   Tues Wed Thurs Fri Sat  COMMENTS                                                                            Keep a log of your blood pressure readings at home  Please take blood pressure at same time of day  Please record date and time blood pressure was taken  Make sure to take your blood pressure when you are seated and resting for about 3 minutes  Please call office if Blood Pressure is <110/<60  You can send these to me via [employer] or by calling the office  This will help me manage your blood pressure better

## 2023-03-20 ENCOUNTER — OFFICE VISIT (OUTPATIENT)
Dept: FAMILY MEDICINE CLINIC | Facility: CLINIC | Age: 53
End: 2023-03-20

## 2023-03-20 VITALS — SYSTOLIC BLOOD PRESSURE: 142 MMHG | HEART RATE: 70 BPM | DIASTOLIC BLOOD PRESSURE: 84 MMHG

## 2023-03-20 DIAGNOSIS — I10 PRIMARY HYPERTENSION: ICD-10-CM

## 2023-03-20 RX ORDER — IRBESARTAN 150 MG/1
150 TABLET ORAL
Qty: 30 TABLET | Refills: 1 | Status: SHIPPED | OUTPATIENT
Start: 2023-03-20

## 2023-03-20 NOTE — PATIENT INSTRUCTIONS
Blood Pressure Diary    Check blood pressures at home and keep a log  Bring log to your follow up or call if the average home BP is greater than 551 systolic and or 90 diastolic before your follow up  Caterina Prakash   Tues  Wed Thurs Fri Sat  COMMENTS                                                                            Keep a log of your blood pressure readings at home  Please take blood pressure at same time of day  Please record date and time blood pressure was taken  Make sure to take your blood pressure when you are seated and resting for about 3 minutes  Please call office if Blood Pressure is <110/<60  You can send these to me via [employer] or by calling the office  This will help me manage your blood pressure better

## 2023-03-20 NOTE — PROGRESS NOTES
Assessment/Plan:    Problem List Items Addressed This Visit     Primary hypertension    Relevant Medications    irbesartan (AVAPRO) 150 mg tablet        Diagnoses and all orders for this visit:    Primary hypertension  -     irbesartan (AVAPRO) 150 mg tablet; Take 1 tablet (150 mg total) by mouth daily at bedtime      No problem-specific Assessment & Plan notes found for this encounter  Subjective:      Patient ID: Zach Banegas is a 47 y o  female  The blood pressure follow-up with NSG with elevated blood pressure  Patient was started on a restarting 75 mg 1 month ago  Blood pressures have been consistently in the 140s over 80s to 90s at home according to patient  Will increase the irbesartan from 75 mg to 150 mg and have patient follow-up in 1 month with nursing for blood pressure check  Hypertension  This is a chronic problem  The problem is uncontrolled  Pertinent negatives include no anxiety, chest pain, headaches, neck pain, orthopnea, peripheral edema, PND or shortness of breath  There are no associated agents to hypertension  There are no known risk factors for coronary artery disease  Past treatments include angiotensin blockers  The current treatment provides moderate improvement  There are no compliance problems  There is no history of angina, CAD/MI or heart failure  There is no history of chronic renal disease, hyperaldosteronism, hyperparathyroidism, a hypertension causing med, renovascular disease or a thyroid problem  The following portions of the patient's history were reviewed and updated as appropriate:   She has no past medical history on file  ,  does not have any pertinent problems on file  ,   has a past surgical history that includes  section  ,  family history includes Breast cancer in her mother; Depression in her mother; Heart attack in her father; Heart disease in her father; Hyperlipidemia in her father; Hypertension in her father and mother; Kidney disease in her father; Stroke in her father; Uterine cancer in her mother  ,   reports that she has been smoking cigarettes  She started smoking about 33 years ago  She has a 30 00 pack-year smoking history  She has never used smokeless tobacco  She reports current alcohol use  She reports that she does not use drugs  ,  has No Known Allergies     Current Outpatient Medications   Medication Sig Dispense Refill   • irbesartan (AVAPRO) 150 mg tablet Take 1 tablet (150 mg total) by mouth daily at bedtime 30 tablet 1   • sertraline (ZOLOFT) 50 mg tablet Take 1 tablet (50 mg total) by mouth daily 90 tablet 1     No current facility-administered medications for this visit  Review of Systems   Respiratory: Negative for shortness of breath  Cardiovascular: Negative for chest pain, orthopnea and PND  Musculoskeletal: Negative for neck pain  Neurological: Negative for headaches  All other systems reviewed and are negative  Objective:  Vitals:    03/20/23 0822   BP: 142/84   BP Location: Left arm   Patient Position: Sitting   Cuff Size: Standard   Pulse: 70     There is no height or weight on file to calculate BMI  Physical Exam  Vitals and nursing note reviewed  Constitutional:       Appearance: Normal appearance  She is well-developed  Interventions: Face mask in place  HENT:      Head: Normocephalic and atraumatic  Right Ear: Tympanic membrane, ear canal and external ear normal       Left Ear: Tympanic membrane, ear canal and external ear normal       Nose: Nose normal       Mouth/Throat:      Mouth: Mucous membranes are moist       Pharynx: Uvula midline  Eyes:      General: Lids are normal       Conjunctiva/sclera: Conjunctivae normal       Pupils: Pupils are equal, round, and reactive to light  Neck:      Thyroid: No thyroid mass or thyromegaly  Vascular: No JVD  Trachea: Trachea and phonation normal    Cardiovascular:      Rate and Rhythm: Normal rate and regular rhythm  Pulses: Normal pulses  Heart sounds: Normal heart sounds, S1 normal and S2 normal  No murmur heard  No friction rub  No gallop  Pulmonary:      Effort: Pulmonary effort is normal       Breath sounds: Normal breath sounds  Abdominal:      General: Bowel sounds are normal       Palpations: Abdomen is soft  Tenderness: There is no abdominal tenderness  Genitourinary:     Comments: Deferred   Musculoskeletal:         General: Normal range of motion  Cervical back: Full passive range of motion without pain, normal range of motion and neck supple  Right lower leg: No edema  Left lower leg: No edema  Lymphadenopathy:      Head:      Right side of head: No submental, submandibular, tonsillar, preauricular, posterior auricular or occipital adenopathy  Left side of head: No submental, submandibular, tonsillar, preauricular, posterior auricular or occipital adenopathy  Cervical: No cervical adenopathy  Skin:     General: Skin is warm and dry  Capillary Refill: Capillary refill takes less than 2 seconds  Neurological:      General: No focal deficit present  Mental Status: She is alert and oriented to person, place, and time  Cranial Nerves: No cranial nerve deficit  Sensory: Sensation is intact  Motor: Motor function is intact  Coordination: Coordination is intact  Gait: Gait is intact  Deep Tendon Reflexes: Reflexes are normal and symmetric  Psychiatric:         Attention and Perception: Attention and perception normal          Mood and Affect: Mood and affect normal          Speech: Speech normal          Behavior: Behavior normal  Behavior is cooperative  Thought Content: Thought content normal          Cognition and Memory: Cognition normal          Judgment: Judgment normal            Portions of the record may have been created with voice recognition software   Occasional wrong word or "sound a like" substitutions may have occurred due to the inherent limitations of voice recognition software  Read the chart carefully and recognize, using context, where substitutions have occurred  Contact me with any questions

## 2023-04-02 ENCOUNTER — APPOINTMENT (EMERGENCY)
Dept: RADIOLOGY | Facility: HOSPITAL | Age: 53
End: 2023-04-02

## 2023-04-02 ENCOUNTER — HOSPITAL ENCOUNTER (EMERGENCY)
Facility: HOSPITAL | Age: 53
Discharge: HOME | End: 2023-04-02
Attending: EMERGENCY MEDICINE

## 2023-04-02 VITALS
SYSTOLIC BLOOD PRESSURE: 149 MMHG | DIASTOLIC BLOOD PRESSURE: 84 MMHG | TEMPERATURE: 98 F | RESPIRATION RATE: 18 BRPM | HEART RATE: 71 BPM | OXYGEN SATURATION: 96 %

## 2023-04-02 DIAGNOSIS — M77.9 TENDINITIS: Primary | ICD-10-CM

## 2023-04-02 RX ORDER — NAPROXEN 500 MG/1
500 TABLET ORAL ONCE
Status: COMPLETED | OUTPATIENT
Start: 2023-04-02 | End: 2023-04-02

## 2023-04-02 RX ORDER — NAPROXEN 500 MG/1
500 TABLET ORAL 2 TIMES DAILY WITH MEALS
Qty: 10 TABLET | Refills: 0 | Status: SHIPPED | OUTPATIENT
Start: 2023-04-02 | End: 2023-04-07

## 2023-04-02 RX ADMIN — NAPROXEN 500 MG: 500 TABLET ORAL at 22:11

## 2023-04-02 NOTE — ED PROVIDER NOTES
History  Chief Complaint   Patient presents with   • Leg Swelling     Pt reports left left ankle swelling that started a week ago  States she works standing 10 hours a day  Now is starting to have pain so that is what brings her in     52-year-old female presents emergency room noting that throughout the day today she has been getting more pain over the medial aspect of her left ankle  She denies any definitive trauma and denies any redness or fever or chills, but she was having more pain and was concerned so she went to the ER assuming that we would get an x-ray  Patient denies any calf pain shortness of breath or chest pain  Patient notes she spends a lot of time on her feet and is concerned  Prior to Admission Medications   Prescriptions Last Dose Informant Patient Reported? Taking?   irbesartan (AVAPRO) 150 mg tablet   No No   Sig: Take 1 tablet (150 mg total) by mouth daily at bedtime   sertraline (ZOLOFT) 50 mg tablet   No No   Sig: Take 1 tablet (50 mg total) by mouth daily      Facility-Administered Medications: None       History reviewed  No pertinent past medical history  Past Surgical History:   Procedure Laterality Date   •  SECTION      1992 and        Family History   Problem Relation Age of Onset   • Hypertension Mother    • Depression Mother    • Breast cancer Mother    • Uterine cancer Mother    • Hyperlipidemia Father    • Hypertension Father    • Kidney disease Father    • Heart attack Father    • Stroke Father    • Heart disease Father      I have reviewed and agree with the history as documented      E-Cigarette/Vaping   • E-Cigarette Use Never User      E-Cigarette/Vaping Substances   • Nicotine No    • THC No    • CBD No    • Flavoring No    • Other No    • Unknown No      Social History     Tobacco Use   • Smoking status: Every Day     Packs/day: 1 00     Years: 30 00     Pack years: 30 00     Types: Cigarettes     Start date:    • Smokeless tobacco: Never Vaping Use   • Vaping Use: Never used   Substance Use Topics   • Alcohol use: Yes     Comment: socially-very rare   • Drug use: Never       Review of Systems   Constitutional: Negative for chills and fever  HENT: Negative for ear pain and sore throat  Eyes: Negative for pain and visual disturbance  Respiratory: Negative for cough and shortness of breath  Cardiovascular: Negative for chest pain and palpitations  Gastrointestinal: Negative for abdominal pain and vomiting  Genitourinary: Negative for dysuria and hematuria  Musculoskeletal: Positive for arthralgias and myalgias  Negative for back pain  Skin: Negative for color change and rash  Neurological: Negative for seizures and syncope  All other systems reviewed and are negative  Physical Exam  Physical Exam  Vitals and nursing note reviewed  Constitutional:       General: She is not in acute distress  Appearance: She is well-developed  HENT:      Head: Normocephalic and atraumatic  Eyes:      Conjunctiva/sclera: Conjunctivae normal    Cardiovascular:      Rate and Rhythm: Normal rate and regular rhythm  Heart sounds: No murmur heard  Pulmonary:      Effort: Pulmonary effort is normal  No respiratory distress  Breath sounds: Normal breath sounds  Abdominal:      Palpations: Abdomen is soft  Tenderness: There is no abdominal tenderness  Musculoskeletal:         General: Swelling and tenderness present  No deformity  Cervical back: Neck supple  Comments: Examination of the left ankle reveals no anterior posterior drawer sign  There is tenderness to the foot inferior to the medial malleolus  As well as anterior  There is no ecchymosis or erythema  Pulses are 2/4 in the dorsalis pedis and posterior tibial regions  Skin:     General: Skin is warm and dry  Capillary Refill: Capillary refill takes less than 2 seconds  Findings: No erythema or rash     Neurological:      General: No focal deficit present  Mental Status: She is alert  Psychiatric:         Mood and Affect: Mood normal          Vital Signs  ED Triage Vitals   Temperature Pulse Respirations Blood Pressure SpO2   04/02/23 1929 04/02/23 1929 04/02/23 1929 04/02/23 1931 04/02/23 1929   98 °F (36 7 °C) 71 18 149/84 96 %      Temp Source Heart Rate Source Patient Position - Orthostatic VS BP Location FiO2 (%)   04/02/23 1929 04/02/23 1929 04/02/23 1929 04/02/23 1929 --   Temporal Monitor Lying Left arm       Pain Score       04/02/23 1929       1           Vitals:    04/02/23 1929 04/02/23 1931   BP:  149/84   Pulse: 71    Patient Position - Orthostatic VS: Lying          Visual Acuity      ED Medications  Medications   naproxen (NAPROSYN) tablet 500 mg (500 mg Oral Given 4/2/23 2211)       Diagnostic Studies  Results Reviewed     None                 XR ankle 3+ views LEFT   Final Result by Idania Patterson DO (04/02 2142)      No acute osseous abnormality  Workstation performed: [id number]                    Procedures  Procedures         ED Course                                             Medical Decision Making  Patient is a 71-year-old female who presents to the emergency department complaining of pain over the medial aspect of her right foot  The patient notes that she does a lot of work on her feet and noticed that she started getting pain in the area just beneath the medial malleolus  She noted that the foot appeared to be a little swollen and slightly bruised  The patient was not sure if she had any definitive event that caused trauma however she notes that it is becoming more painful so she was concerned and came to the hospital   Patient is neurovascular intact on evaluation with normal dorsalis pedis and posterior tibial pulses  Capillary refill is less than 3 seconds  X-ray was done and read by radiology as negative for acute fracture    He was told that the patient's symptoms are most likely due to tendinitis and will be placed on Naprosyn  Patient was told to follow-up with her family doctor over the next 3 to 5 days for repeat evaluation and return to the ER if worse  Amount and/or Complexity of Data Reviewed  Radiology: ordered  Risk  Prescription drug management  Disposition  Final diagnoses:   Tendinitis     Time reflects when diagnosis was documented in both MDM as applicable and the Disposition within this note     Time User Action Codes Description Comment    4/2/2023  9:47 PM Charly Dutat Add [M77 9] Tendinitis     4/2/2023  9:48 PM Charlotte Restrepo Add [M77 50] Foot tendinitis     4/2/2023  9:48 PM Joselyn Restrepo Boer [M77 50] Foot tendinitis       ED Disposition     ED Disposition   Discharge    Condition   Stable    Date/Time   Sun Apr 2, 2023  9:47 PM    [facility] discharge to home/self care  Follow-up Information     Follow up With Specialties Details [address]  Internal Medicine, Nurse Practitioner On 4/7/2023  [address]   [phone]            Discharge Medication List as of 4/2/2023  9:50 PM      START taking these medications    Details   naproxen (Naprosyn) 500 mg tablet Take 1 tablet (500 mg total) by mouth 2 (two) times a day with meals for 5 days, Starting Sun 4/2/2023, Until Fri 4/7/2023, Normal         CONTINUE these medications which have NOT CHANGED    Details   irbesartan (AVAPRO) 150 mg tablet Take 1 tablet (150 mg total) by mouth daily at bedtime, Starting Mon 3/20/2023, Normal      sertraline (ZOLOFT) 50 mg tablet Take 1 tablet (50 mg total) by mouth daily, Starting Tue 1/17/2023, Normal             No discharge procedures on file      PDMP Review     None          ED Provider  Electronically Signed by           Luis Angel Nguyen DO  04/03/23 0030

## 2023-04-03 NOTE — DISCHARGE INSTRUCTIONS
Ice to sore areas 4-6 times a day for 10 to 15 minutes at a time  Use gel splint as discussed for comfort  Use crutches to stay off the foot for the next 2 days  Begin to slowly bear weight as tolerated  Use Naprosyn 1 pill twice a day  Take with food  Do not take Advil, Motrin, ibuprofen, Aleve, or aspirin while on Naprosyn  You may take Tylenol if necessary  Check with your family doctor in 2 to 4 days for repeat evaluation

## 2023-04-19 DIAGNOSIS — I10 PRIMARY HYPERTENSION: ICD-10-CM

## 2023-05-11 DIAGNOSIS — I10 PRIMARY HYPERTENSION: ICD-10-CM

## 2023-05-11 RX ORDER — IRBESARTAN 150 MG/1
TABLET ORAL
Qty: 30 TABLET | Refills: 1 | Status: SHIPPED | OUTPATIENT
Start: 2023-05-11

## 2023-06-19 DIAGNOSIS — F33.1 MODERATE EPISODE OF RECURRENT MAJOR DEPRESSIVE DISORDER (HCC): ICD-10-CM

## 2023-06-26 ENCOUNTER — OFFICE VISIT (OUTPATIENT)
Dept: URGENT CARE | Facility: CLINIC | Age: 53
End: 2023-06-26
Payer: COMMERCIAL

## 2023-06-26 VITALS
SYSTOLIC BLOOD PRESSURE: 129 MMHG | TEMPERATURE: 97.8 F | RESPIRATION RATE: 18 BRPM | HEART RATE: 68 BPM | DIASTOLIC BLOOD PRESSURE: 84 MMHG | OXYGEN SATURATION: 98 %

## 2023-06-26 DIAGNOSIS — M72.2 PLANTAR FASCIITIS: Primary | ICD-10-CM

## 2023-06-26 PROCEDURE — 99213 OFFICE O/P EST LOW 20 MIN: CPT | Performed by: STUDENT IN AN ORGANIZED HEALTH CARE EDUCATION/TRAINING PROGRAM

## 2023-06-26 NOTE — PROGRESS NOTES
[facility] [facility]        NAME: Alanna Gomes is a 47 y o  female  : 1970    MRN: [id number]  DATE: 2023  TIME: 11:50 AM    Assessment and Orders   Plantar fasciitis [M72 2]  1  Plantar fasciitis  Ambulatory Referral to Physical Therapy    CANCELED: Ambulatory Referral to Physical Therapy            Plan and Discussion      Symptoms and exam consistent with plantar fasciitis  Stretching and exercises printed for patient  Referred to PT  Discussed ED precautions including (but not limited to)  • Difficultly breathing or shortness of breath  • Chest pain  • Acutely worsening symptoms  Risks and benefits discussed  Patient understands and agrees with the plan  Follow up with PCP  Chief Complaint     Chief Complaint   Patient presents with   • Foot Pain     Left foot pain and swelling, x2 months, patient does not recall injuring her left foot         History of Present Illness       Patient is a 48 yo F who presents with left foot pain  Pain has been going on for over 2 months  No trauma or injury, but does work all day on her feet  She was originally seen in the ED where xrays were negative  Patient states that while pain was originally in the ankle, it now seems to be on the bottom of the foot  Worse in the mornings  Has good and bad days   No numbness, tingling or weakness      Review of Systems   Review of Systems      Current Medications       Current Outpatient Medications:   •  irbesartan (AVAPRO) 150 mg tablet, TAKE 1 TABLET BY MOUTH AT BEDTIME, Disp: 30 tablet, Rfl: 1  •  sertraline (ZOLOFT) 50 mg tablet, TAKE 1 TABLET BY MOUTH EVERY DAY, Disp: 90 tablet, Rfl: 1  •  naproxen (Naprosyn) 500 mg tablet, Take 1 tablet (500 mg total) by mouth 2 (two) times a day with meals for 5 days, Disp: 10 tablet, Rfl: 0    Current Allergies     Allergies as of 2023   • (No Known Allergies)            The following portions of the patient's history were reviewed and updated as appropriate: allergies, current medications, past family history, past medical history, past social history, past surgical history and problem list      History reviewed  No pertinent past medical history  Past Surgical History:   Procedure Laterality Date   •  SECTION      1992 and        Family History   Problem Relation Age of Onset   • Hypertension Mother    • Depression Mother    • Breast cancer Mother    • Uterine cancer Mother    • Hyperlipidemia Father    • Hypertension Father    • Kidney disease Father    • Heart attack Father    • Stroke Father    • Heart disease Father          Medications have been verified  Objective   /84   Pulse 68   Temp 97 8 °F (36 6 °C)   Resp 18   SpO2 98%   No LMP recorded  Physical Exam     Physical Exam  Cardiovascular:      Rate and Rhythm: Normal rate  Pulmonary:      Effort: No respiratory distress  Musculoskeletal:         General: No swelling, deformity or signs of injury  Right lower leg: No edema  Left lower leg: No edema  Feet:    Feet:      Left foot:      Skin integrity: Skin integrity normal       Comments: Area of tenderness, tenderness is worst at the heal    Neurological:      General: No focal deficit present  Mental Status: She is alert and oriented to person, place, and time     Psychiatric:         Mood and Affect: Mood normal          Behavior: Behavior normal                Hernan Payan DO

## 2023-07-03 ENCOUNTER — OFFICE VISIT (OUTPATIENT)
Dept: FAMILY MEDICINE CLINIC | Facility: CLINIC | Age: 53
End: 2023-07-03
Payer: COMMERCIAL

## 2023-07-03 VITALS
SYSTOLIC BLOOD PRESSURE: 136 MMHG | DIASTOLIC BLOOD PRESSURE: 82 MMHG | TEMPERATURE: 98.2 F | HEIGHT: 65 IN | HEART RATE: 87 BPM | OXYGEN SATURATION: 99 % | BODY MASS INDEX: 24.99 KG/M2 | WEIGHT: 150 LBS

## 2023-07-03 DIAGNOSIS — I10 PRIMARY HYPERTENSION: ICD-10-CM

## 2023-07-03 DIAGNOSIS — Z12.11 SCREENING FOR COLORECTAL CANCER: ICD-10-CM

## 2023-07-03 DIAGNOSIS — M72.2 PLANTAR FASCIITIS, LEFT: Primary | ICD-10-CM

## 2023-07-03 DIAGNOSIS — Z12.12 SCREENING FOR COLORECTAL CANCER: ICD-10-CM

## 2023-07-03 PROCEDURE — 99213 OFFICE O/P EST LOW 20 MIN: CPT | Performed by: NURSE PRACTITIONER

## 2023-07-03 NOTE — PROGRESS NOTES
Assessment/Plan:    Problem List Items Addressed This Visit     Primary hypertension   Other Visit Diagnoses     Plantar fasciitis, left    -  Primary    Screening for colorectal cancer        Relevant Orders    Cologuard           Diagnoses and all orders for this visit:    Plantar fasciitis, left    Screening for colorectal cancer  -     Cologuard    Primary hypertension        No problem-specific Assessment & Plan notes found for this encounter. Subjective:      Patient ID: Ibrahima Torres is a 47 y.o. female. Patient was seen in urgent care in  for left planter fasciitis. Patient has been doing the home physical therapy, nonsteroidal anti-inflammatory drugs OTC, and purchased plantar fascia shoe inserts. Patient does report that the symptoms have improved. However I recommend patient get a dorsal foot splint for at night. Patient will have a follow-up patient has a routine follow-up scheduled in August at which time if the positive fasciitis has not resolved patient would consider an injection. The following portions of the patient's history were reviewed and updated as appropriate:   She has no past medical history on file. ,  does not have any pertinent problems on file. ,   has a past surgical history that includes  section. ,  family history includes Breast cancer in her mother; Depression in her mother; Heart attack in her father; Heart disease in her father; Hyperlipidemia in her father; Hypertension in her father and mother; Kidney disease in her father; Stroke in her father; Uterine cancer in her mother. ,   reports that she has been smoking cigarettes. She started smoking about 33 years ago. She has a 30.00 pack-year smoking history. She has never used smokeless tobacco. She reports that she does not currently use alcohol. She reports that she does not use drugs. ,  has No Known Allergies. .  Current Outpatient Medications   Medication Sig Dispense Refill   • irbesartan (AVAPRO) 150 mg tablet TAKE 1 TABLET BY MOUTH AT BEDTIME 30 tablet 1   • sertraline (ZOLOFT) 50 mg tablet TAKE 1 TABLET BY MOUTH EVERY DAY 90 tablet 1   • naproxen (Naprosyn) 500 mg tablet Take 1 tablet (500 mg total) by mouth 2 (two) times a day with meals for 5 days (Patient not taking: Reported on 7/3/2023) 10 tablet 0     No current facility-administered medications for this visit. Review of Systems   All other systems reviewed and are negative. Objective:  Vitals:    07/03/23 0711 07/03/23 0727   BP: 150/88 136/82   Pulse: 87    Temp: 98.2 °F (36.8 °C)    TempSrc: Tympanic    SpO2: 99%    Weight: 68 kg (150 lb)    Height: 5' 5" (1.651 m)      Body mass index is 24.96 kg/m². Physical Exam  Vitals and nursing note reviewed. Constitutional:       Appearance: Normal appearance. She is well-developed. HENT:      Head: Normocephalic and atraumatic. Right Ear: Tympanic membrane, ear canal and external ear normal.      Left Ear: Tympanic membrane, ear canal and external ear normal.      Nose: Nose normal.      Mouth/Throat:      Mouth: Mucous membranes are moist.      Pharynx: Uvula midline. Eyes:      General: Lids are normal.      Conjunctiva/sclera: Conjunctivae normal.      Pupils: Pupils are equal, round, and reactive to light. Neck:      Thyroid: No thyroid mass or thyromegaly. Vascular: No JVD. Trachea: Trachea and phonation normal.   Cardiovascular:      Rate and Rhythm: Normal rate and regular rhythm. Pulses: Normal pulses. Heart sounds: Normal heart sounds, S1 normal and S2 normal. No murmur heard. No friction rub. No gallop. Pulmonary:      Effort: Pulmonary effort is normal.      Breath sounds: Normal breath sounds. Abdominal:      General: Bowel sounds are normal.      Palpations: Abdomen is soft. Tenderness: There is no abdominal tenderness.    Genitourinary:     Comments: Deferred   Musculoskeletal:         General: Normal range of motion. Cervical back: Full passive range of motion without pain, normal range of motion and neck supple. Right lower leg: No edema. Left lower leg: No edema. Feet:    Lymphadenopathy:      Head:      Right side of head: No submental, submandibular, tonsillar, preauricular, posterior auricular or occipital adenopathy. Left side of head: No submental, submandibular, tonsillar, preauricular, posterior auricular or occipital adenopathy. Cervical: No cervical adenopathy. Skin:     General: Skin is warm and dry. Capillary Refill: Capillary refill takes less than 2 seconds. Neurological:      General: No focal deficit present. Mental Status: She is alert and oriented to person, place, and time. Cranial Nerves: No cranial nerve deficit. Sensory: Sensation is intact. Motor: Motor function is intact. Coordination: Coordination is intact. Gait: Gait is intact. Deep Tendon Reflexes: Reflexes are normal and symmetric. Psychiatric:         Attention and Perception: Attention and perception normal.         Mood and Affect: Mood and affect normal.         Speech: Speech normal.         Behavior: Behavior normal. Behavior is cooperative. Thought Content: Thought content normal.         Cognition and Memory: Cognition normal.         Judgment: Judgment normal.           Portions of the record may have been created with voice recognition software. Occasional wrong word or "sound a like" substitutions may have occurred due to the inherent limitations of voice recognition software. Read the chart carefully and recognize, using context, where substitutions have occurred. Contact me with any questions.

## 2023-07-14 DIAGNOSIS — I10 PRIMARY HYPERTENSION: ICD-10-CM

## 2023-07-14 RX ORDER — IRBESARTAN 150 MG/1
TABLET ORAL
Qty: 90 TABLET | Refills: 1 | Status: SHIPPED | OUTPATIENT
Start: 2023-07-14

## 2023-08-29 ENCOUNTER — OFFICE VISIT (OUTPATIENT)
Dept: FAMILY MEDICINE CLINIC | Facility: CLINIC | Age: 53
End: 2023-08-29

## 2023-08-29 VITALS
TEMPERATURE: 96.8 F | HEART RATE: 71 BPM | SYSTOLIC BLOOD PRESSURE: 142 MMHG | OXYGEN SATURATION: 98 % | HEIGHT: 65 IN | WEIGHT: 150 LBS | BODY MASS INDEX: 24.99 KG/M2 | DIASTOLIC BLOOD PRESSURE: 86 MMHG

## 2023-08-29 DIAGNOSIS — B07.0 PLANTAR WART OF RIGHT FOOT: ICD-10-CM

## 2023-08-29 DIAGNOSIS — I10 PRIMARY HYPERTENSION: ICD-10-CM

## 2023-08-29 DIAGNOSIS — Z23 ENCOUNTER FOR IMMUNIZATION: Primary | ICD-10-CM

## 2023-08-29 DIAGNOSIS — Z13.29 SCREENING FOR THYROID DISORDER: ICD-10-CM

## 2023-08-29 DIAGNOSIS — Z13.220 SCREENING FOR LIPID DISORDERS: ICD-10-CM

## 2023-08-29 DIAGNOSIS — Z13.0 SCREENING FOR DEFICIENCY ANEMIA: ICD-10-CM

## 2023-08-29 DIAGNOSIS — Z13.1 SCREENING FOR DIABETES MELLITUS: ICD-10-CM

## 2023-08-29 NOTE — PROGRESS NOTES
Assessment/Plan:    Problem List Items Addressed This Visit     Primary hypertension   Other Visit Diagnoses     Encounter for immunization    -  Primary    Relevant Orders    Zoster Vaccine Recombinant IM (Completed)    Screening for thyroid disorder        Relevant Orders    TSH, 3rd generation with Free T4 reflex    Screening for diabetes mellitus        Relevant Orders    Comprehensive metabolic panel    Screening for deficiency anemia        Relevant Orders    CBC and differential    Screening for lipid disorders        Relevant Orders    Lipid Panel with Direct LDL reflex    Plantar wart of right foot        Relevant Orders    Lesion Destruction (Completed)           Diagnoses and all orders for this visit:    Encounter for immunization  -     Zoster Vaccine Recombinant IM    Primary hypertension    Screening for thyroid disorder  -     TSH, 3rd generation with Free T4 reflex; Future    Screening for diabetes mellitus  -     Comprehensive metabolic panel; Future    Screening for deficiency anemia  -     CBC and differential; Future    Screening for lipid disorders  -     Lipid Panel with Direct LDL reflex; Future    Plantar wart of right foot  -     Lesion Destruction        No problem-specific Assessment & Plan notes found for this encounter. Subjective:      Patient ID: Nickie Vidales is a 47 y.o. female. Warts  Patient complains of warts. The warts are located on right foot dorsum at the ball between 2nd and 3rd metataral.  It has been present for a few months. The patient denies pain or cellulitic infection symptoms. Hypertension  This is a chronic problem. The problem is uncontrolled. There are no associated agents to hypertension. Risk factors for coronary artery disease include smoking/tobacco exposure and post-menopausal state. Past treatments include angiotensin blockers. The current treatment provides moderate improvement. There are no compliance problems.         The following portions of the patient's history were reviewed and updated as appropriate:   She has no past medical history on file. ,  does not have any pertinent problems on file. ,   has a past surgical history that includes  section. ,  family history includes Breast cancer in her mother; Depression in her mother; Heart attack in her father; Heart disease in her father; Hyperlipidemia in her father; Hypertension in her father and mother; Kidney disease in her father; Stroke in her father; Uterine cancer in her mother. ,   reports that she has been smoking cigarettes. She started smoking about 33 years ago. She has a 30.00 pack-year smoking history. She has never used smokeless tobacco. She reports that she does not currently use alcohol. She reports that she does not use drugs. ,  has No Known Allergies. .  Current Outpatient Medications   Medication Sig Dispense Refill   • irbesartan (AVAPRO) 150 mg tablet TAKE 1 TABLET BY MOUTH EVERYDAY AT BEDTIME 90 tablet 1   • sertraline (ZOLOFT) 50 mg tablet TAKE 1 TABLET BY MOUTH EVERY DAY 90 tablet 1   • naproxen (Naprosyn) 500 mg tablet Take 1 tablet (500 mg total) by mouth 2 (two) times a day with meals for 5 days (Patient not taking: Reported on 7/3/2023) 10 tablet 0     No current facility-administered medications for this visit. Review of Systems   All other systems reviewed and are negative. Objective:  Vitals:    23 1420   BP: 142/86   BP Location: Left arm   Patient Position: Sitting   Cuff Size: Adult   Pulse: 71   Temp: (!) 96.8 °F (36 °C)   TempSrc: Tympanic   SpO2: 98%   Weight: 68 kg (150 lb)   Height: 5' 5" (1.651 m)     Body mass index is 24.96 kg/m². Physical Exam  Vitals and nursing note reviewed. Constitutional:       Appearance: Normal appearance. She is well-developed. HENT:      Head: Normocephalic and atraumatic.       Right Ear: Tympanic membrane, ear canal and external ear normal.      Left Ear: Tympanic membrane, ear canal and external ear normal.      Nose: Nose normal.      Mouth/Throat:      Mouth: Mucous membranes are moist.      Pharynx: Uvula midline. Eyes:      General: Lids are normal.      Conjunctiva/sclera: Conjunctivae normal.      Pupils: Pupils are equal, round, and reactive to light. Neck:      Thyroid: No thyroid mass or thyromegaly. Vascular: No JVD. Trachea: Trachea and phonation normal.   Cardiovascular:      Rate and Rhythm: Normal rate and regular rhythm. Pulses: Normal pulses. Heart sounds: Normal heart sounds, S1 normal and S2 normal. No murmur heard. No friction rub. No gallop. Pulmonary:      Effort: Pulmonary effort is normal.      Breath sounds: Normal breath sounds. Abdominal:      General: Bowel sounds are normal.      Palpations: Abdomen is soft. Tenderness: There is no abdominal tenderness. Genitourinary:     Comments: Deferred   Musculoskeletal:         General: Normal range of motion. Cervical back: Full passive range of motion without pain, normal range of motion and neck supple. Right lower leg: No edema. Left lower leg: No edema. Feet:    Lymphadenopathy:      Head:      Right side of head: No submental, submandibular, tonsillar, preauricular, posterior auricular or occipital adenopathy. Left side of head: No submental, submandibular, tonsillar, preauricular, posterior auricular or occipital adenopathy. Cervical: No cervical adenopathy. Skin:     General: Skin is warm and dry. Capillary Refill: Capillary refill takes less than 2 seconds. Neurological:      General: No focal deficit present. Mental Status: She is alert and oriented to person, place, and time. Cranial Nerves: No cranial nerve deficit. Sensory: Sensation is intact. Motor: Motor function is intact. Coordination: Coordination is intact. Gait: Gait is intact. Deep Tendon Reflexes: Reflexes are normal and symmetric.    Psychiatric: Attention and Perception: Attention and perception normal.         Mood and Affect: Mood and affect normal.         Speech: Speech normal.         Behavior: Behavior normal. Behavior is cooperative. Thought Content: Thought content normal.         Cognition and Memory: Cognition normal.         Judgment: Judgment normal.         Lesion Destruction    Date/Time: 8/29/2023 2:15 PM    Performed by: ZAHRAA Aguila Ra  Authorized by: ZAHRAA Aguila Ra  Universal Protocol:  Consent: Verbal consent obtained. Consent given by: patient  Patient understanding: patient states understanding of the procedure being performed  Patient consent: the patient's understanding of the procedure matches consent given  Procedure consent: procedure consent matches procedure scheduled  Site marked: the operative site was not marked  Patient identity confirmed: verbally with patient      Procedure Details - Lesion Destruction:     Number of Lesions:  1  Lesion 1:     Body area:  Lower extremity    Lower extremity location:  R foot    Initial size (mm):  3    Final defect size (mm):  3    Malignancy comment:  Plantar wart    Destruction method: cryotherapy          Portions of the record may have been created with voice recognition software. Occasional wrong word or "sound a like" substitutions may have occurred due to the inherent limitations of voice recognition software. Read the chart carefully and recognize, using context, where substitutions have occurred. Contact me with any questions.

## 2023-10-03 ENCOUNTER — OFFICE VISIT (OUTPATIENT)
Dept: FAMILY MEDICINE CLINIC | Facility: CLINIC | Age: 53
End: 2023-10-03
Payer: COMMERCIAL

## 2023-10-03 VITALS
TEMPERATURE: 98.5 F | HEIGHT: 65 IN | BODY MASS INDEX: 25.89 KG/M2 | OXYGEN SATURATION: 97 % | DIASTOLIC BLOOD PRESSURE: 88 MMHG | HEART RATE: 97 BPM | WEIGHT: 155.4 LBS | SYSTOLIC BLOOD PRESSURE: 142 MMHG

## 2023-10-03 DIAGNOSIS — Z23 ENCOUNTER FOR VACCINATION: ICD-10-CM

## 2023-10-03 DIAGNOSIS — B07.0 PLANTAR WART: Primary | ICD-10-CM

## 2023-10-03 PROCEDURE — 99213 OFFICE O/P EST LOW 20 MIN: CPT | Performed by: NURSE PRACTITIONER

## 2023-10-03 PROCEDURE — 90471 IMMUNIZATION ADMIN: CPT

## 2023-10-03 PROCEDURE — 90686 IIV4 VACC NO PRSV 0.5 ML IM: CPT

## 2023-10-03 NOTE — PROGRESS NOTES
Assessment/Plan:    Problem List Items Addressed This Visit    None  Visit Diagnoses     Plantar wart    -  Primary    Relevant Orders    Lesion Destruction (Completed)    Encounter for vaccination        Relevant Orders    influenza vaccine, quadrivalent, 0.5 mL, preservative-free, for adult and pediatric patients 6 mos+ (AFLURIA, FLUARIX, FLULAVAL, FLUZONE) (Completed)           Diagnoses and all orders for this visit:    Plantar wart  -     Lesion Destruction    Encounter for vaccination  -     influenza vaccine, quadrivalent, 0.5 mL, preservative-free, for adult and pediatric patients 6 mos+ (AFLURIA, FLUARIX, FLULAVAL, FLUZONE)    Other orders  -     Cancel: Incision and Drainage  -     Cancel: Wound repair        No problem-specific Assessment & Plan notes found for this encounter. Subjective:      Patient ID: Caitlin Harmon is a 47 y.o. female. Warts  Patient complains of warts. The warts are located on right foot at the ball between the 2nd and 3rd metatarsal. IT has been present for a few months. The patient denies pain or cellulitic infection symptoms. Prior Tx included callous debulking and cryotherapy. Pt has continued with callous debulking with foot soaks and wrapping the area with a pumice stone 1- 2 times per week. Wart has decreased in size significantly 1 mm. Recommending another session of cryotherapy today and will F/U for routine in 2024. The following portions of the patient's history were reviewed and updated as appropriate:   She has no past medical history on file. ,  does not have any pertinent problems on file. ,   has a past surgical history that includes  section. ,  family history includes Breast cancer in her mother; Depression in her mother; Heart attack in her father; Heart disease in her father; Hyperlipidemia in her father; Hypertension in her father and mother; Kidney disease in her father; Stroke in her father; Uterine cancer in her mother. ,   reports that she has been smoking cigarettes. She started smoking about 33 years ago. She has a 30.00 pack-year smoking history. She has never used smokeless tobacco. She reports that she does not currently use alcohol. She reports that she does not use drugs. ,  has No Known Allergies. .  Current Outpatient Medications   Medication Sig Dispense Refill   • irbesartan (AVAPRO) 150 mg tablet TAKE 1 TABLET BY MOUTH EVERYDAY AT BEDTIME 90 tablet 1   • sertraline (ZOLOFT) 50 mg tablet TAKE 1 TABLET BY MOUTH EVERY DAY 90 tablet 1   • naproxen (Naprosyn) 500 mg tablet Take 1 tablet (500 mg total) by mouth 2 (two) times a day with meals for 5 days (Patient not taking: Reported on 7/3/2023) 10 tablet 0     No current facility-administered medications for this visit. Review of Systems   All other systems reviewed and are negative. Objective:  Vitals:    10/03/23 0657 10/03/23 0715   BP: 152/98 142/88   Pulse: 97    Temp: 98.5 °F (36.9 °C)    TempSrc: Tympanic    SpO2: 97%    Weight: 70.5 kg (155 lb 6.4 oz)    Height: 5' 5" (1.651 m)      Body mass index is 25.86 kg/m². Physical Exam  Vitals and nursing note reviewed. Constitutional:       Appearance: Normal appearance. She is well-developed. HENT:      Head: Normocephalic and atraumatic. Right Ear: Tympanic membrane, ear canal and external ear normal.      Left Ear: Tympanic membrane, ear canal and external ear normal.      Nose: Nose normal.      Mouth/Throat:      Mouth: Mucous membranes are moist.      Pharynx: Uvula midline. Eyes:      General: Lids are normal.      Conjunctiva/sclera: Conjunctivae normal.      Pupils: Pupils are equal, round, and reactive to light. Neck:      Thyroid: No thyroid mass or thyromegaly. Vascular: No JVD. Trachea: Trachea and phonation normal.   Cardiovascular:      Rate and Rhythm: Normal rate and regular rhythm. Pulses: Normal pulses.       Heart sounds: Normal heart sounds, S1 normal and S2 normal. No murmur heard. No friction rub. No gallop. Pulmonary:      Effort: Pulmonary effort is normal.      Breath sounds: Normal breath sounds. Abdominal:      General: Bowel sounds are normal.      Palpations: Abdomen is soft. Tenderness: There is no abdominal tenderness. Genitourinary:     Comments: Deferred   Musculoskeletal:         General: Normal range of motion. Cervical back: Full passive range of motion without pain, normal range of motion and neck supple. Right lower leg: No edema. Left lower leg: No edema. Feet:    Lymphadenopathy:      Head:      Right side of head: No submental, submandibular, tonsillar, preauricular, posterior auricular or occipital adenopathy. Left side of head: No submental, submandibular, tonsillar, preauricular, posterior auricular or occipital adenopathy. Cervical: No cervical adenopathy. Skin:     General: Skin is warm and dry. Capillary Refill: Capillary refill takes less than 2 seconds. Neurological:      General: No focal deficit present. Mental Status: She is alert and oriented to person, place, and time. Cranial Nerves: No cranial nerve deficit. Sensory: Sensation is intact. Motor: Motor function is intact. Coordination: Coordination is intact. Gait: Gait is intact. Deep Tendon Reflexes: Reflexes are normal and symmetric. Psychiatric:         Attention and Perception: Attention and perception normal.         Mood and Affect: Mood and affect normal.         Speech: Speech normal.         Behavior: Behavior normal. Behavior is cooperative. Thought Content:  Thought content normal.         Cognition and Memory: Cognition normal.         Judgment: Judgment normal.             Lesion Destruction    Date/Time: 10/3/2023 7:00 AM    Performed by: ZAHRAA García  Authorized by: ZAHRAA García  Universal Protocol:  Consent given by: patient  Time out: Immediately prior to procedure a "time out" was called to verify the correct patient, procedure, equipment, support staff and site/side marked as required. Patient understanding: patient states understanding of the procedure being performed  Patient consent: the patient's understanding of the procedure matches consent given  Procedure consent: procedure consent matches procedure scheduled  Patient identity confirmed: verbally with patient      Procedure Details - Lesion Destruction:     Number of Lesions:  1  Lesion 1:     Body area:  Lower extremity    Lower extremity location:  R foot    Initial size (mm):  1    Malignancy comment:  Plantar wart    Destruction method: cryotherapy      Cosmetic?: Yes      Portions of the record may have been created with voice recognition software. Occasional wrong word or "sound a like" substitutions may have occurred due to the inherent limitations of voice recognition software. Read the chart carefully and recognize, using context, where substitutions have occurred. Contact me with any questions.

## 2023-11-14 ENCOUNTER — OFFICE VISIT (OUTPATIENT)
Dept: FAMILY MEDICINE CLINIC | Facility: CLINIC | Age: 53
End: 2023-11-14
Payer: COMMERCIAL

## 2023-11-14 VITALS
OXYGEN SATURATION: 97 % | TEMPERATURE: 97.9 F | SYSTOLIC BLOOD PRESSURE: 130 MMHG | DIASTOLIC BLOOD PRESSURE: 78 MMHG | HEART RATE: 80 BPM | HEIGHT: 65 IN | BODY MASS INDEX: 26.33 KG/M2 | WEIGHT: 158 LBS

## 2023-11-14 DIAGNOSIS — B07.0 PLANTAR WART: Primary | ICD-10-CM

## 2023-11-14 PROCEDURE — 99213 OFFICE O/P EST LOW 20 MIN: CPT | Performed by: NURSE PRACTITIONER

## 2023-11-14 NOTE — PROGRESS NOTES
Assessment/Plan:    Problem List Items Addressed This Visit    None  Visit Diagnoses     Plantar wart    -  Primary    Relevant Orders    Lesion Destruction (Completed)             Diagnoses and all orders for this visit:    Plantar wart  -     Lesion Destruction        No problem-specific Assessment & Plan notes found for this encounter. Subjective:      Patient ID: Kevin Ware is a 47 y.o. female. Patient presents for a plantar wart follow-up of the right foot. This has been debulked and frozen 2 times without resolution. Patient is electing to have Sx removed with Punch Bx. Discussed ricks/benefits of Sx removal, verbal understanding provided. The following portions of the patient's history were reviewed and updated as appropriate:   She has no past medical history on file. ,  does not have any pertinent problems on file. ,   has a past surgical history that includes  section. ,  family history includes Breast cancer in her mother; Depression in her mother; Heart attack in her father; Heart disease in her father; Hyperlipidemia in her father; Hypertension in her father and mother; Kidney disease in her father; Stroke in her father; Uterine cancer in her mother. ,   reports that she has been smoking cigarettes. She started smoking about 33 years ago. She has a 30.00 pack-year smoking history. She has never used smokeless tobacco. She reports that she does not currently use alcohol. She reports that she does not use drugs. ,  has No Known Allergies. .  Current Outpatient Medications   Medication Sig Dispense Refill   • irbesartan (AVAPRO) 150 mg tablet TAKE 1 TABLET BY MOUTH EVERYDAY AT BEDTIME 90 tablet 1   • sertraline (ZOLOFT) 50 mg tablet TAKE 1 TABLET BY MOUTH EVERY DAY 90 tablet 1   • naproxen (Naprosyn) 500 mg tablet Take 1 tablet (500 mg total) by mouth 2 (two) times a day with meals for 5 days (Patient not taking: Reported on 7/3/2023) 10 tablet 0     No current facility-administered medications for this visit. Review of Systems   Skin:         Plantar wart right foot   All other systems reviewed and are negative. Objective:  Vitals:    11/14/23 1457   BP: 130/78   Pulse: 80   Temp: 97.9 °F (36.6 °C)   TempSrc: Tympanic   SpO2: 97%   Weight: 71.7 kg (158 lb)   Height: 5' 5" (1.651 m)     Body mass index is 26.29 kg/m². Physical Exam  Vitals and nursing note reviewed. Cardiovascular:      Rate and Rhythm: Normal rate. Pulmonary:      Effort: Pulmonary effort is normal.   Musculoskeletal:        Feet:    Neurological:      Mental Status: She is alert. Lesion Destruction    Date/Time: 11/14/2023 3:00 PM    Performed by: ZAHRAA Tay  Authorized by: ZAHRAA Tay  Universal Protocol:  Consent: Verbal consent obtained. Consent given by: patient  Time out: Immediately prior to procedure a "time out" was called to verify the correct patient, procedure, equipment, support staff and site/side marked as required. Patient understanding: patient states understanding of the procedure being performed  Patient consent: the patient's understanding of the procedure matches consent given  Patient identity confirmed: verbally with patient    Procedure Details - Lesion Destruction:     Number of Lesions:  1  Lesion 1:     Body area:  Lower extremity    Lower extremity location:  R foot    Initial size (mm):  3    Final defect size (mm):  0    Malignancy comment:  Plantar wart    Destruction method comment:  Punch Bx Sx removal      Portions of the record may have been created with voice recognition software. Occasional wrong word or "sound a like" substitutions may have occurred due to the inherent limitations of voice recognition software. Read the chart carefully and recognize, using context, where substitutions have occurred. Contact me with any questions.

## 2023-11-28 ENCOUNTER — CLINICAL SUPPORT (OUTPATIENT)
Dept: FAMILY MEDICINE CLINIC | Facility: CLINIC | Age: 53
End: 2023-11-28
Payer: COMMERCIAL

## 2023-11-28 DIAGNOSIS — Z48.02 VISIT FOR SUTURE REMOVAL: Primary | ICD-10-CM

## 2023-11-28 PROCEDURE — S0630 REMOVAL OF SUTURES: HCPCS | Performed by: NURSE PRACTITIONER

## 2023-12-08 ENCOUNTER — OFFICE VISIT (OUTPATIENT)
Dept: FAMILY MEDICINE CLINIC | Facility: CLINIC | Age: 53
End: 2023-12-08

## 2023-12-08 DIAGNOSIS — L84 CALLUS OF FOOT: Primary | ICD-10-CM

## 2023-12-08 NOTE — PROGRESS NOTES
Assessment/Plan:    Problem List Items Addressed This Visit    None  Visit Diagnoses     Callus of foot    -  Primary           Diagnoses and all orders for this visit:    Callus of foot        No problem-specific Assessment & Plan notes found for this encounter. Subjective:      Patient ID: Kevin Ware is a 47 y.o. female. Pt had a plantar wart removed from ball of right foot  s/p failed Cryotherapies. Pt has suture removed . Pt has noted that callus in area has reformed and is painful when ambulating. She continues with foot soaks and pumice/lava rock with little improvement of remaining callus and noted a black spot in the center of the affected area. During debriding of callus, it is noted the black spot was dirt. The following portions of the patient's history were reviewed and updated as appropriate:   She has no past medical history on file. ,  does not have any pertinent problems on file. ,   has a past surgical history that includes  section. ,  family history includes Breast cancer in her mother; Depression in her mother; Heart attack in her father; Heart disease in her father; Hyperlipidemia in her father; Hypertension in her father and mother; Kidney disease in her father; Stroke in her father; Uterine cancer in her mother. ,   reports that she has been smoking cigarettes. She started smoking about 33 years ago. She has a 30.00 pack-year smoking history. She has never used smokeless tobacco. She reports that she does not currently use alcohol. She reports that she does not use drugs. ,  has No Known Allergies. .  Current Outpatient Medications   Medication Sig Dispense Refill   • irbesartan (AVAPRO) 150 mg tablet TAKE 1 TABLET BY MOUTH EVERYDAY AT BEDTIME 90 tablet 1   • naproxen (Naprosyn) 500 mg tablet Take 1 tablet (500 mg total) by mouth 2 (two) times a day with meals for 5 days (Patient not taking: Reported on 7/3/2023) 10 tablet 0   • sertraline (ZOLOFT) 50 mg tablet TAKE 1 TABLET BY MOUTH EVERY DAY 90 tablet 1     No current facility-administered medications for this visit. Review of Systems   Skin:         Callus ball right foot   All other systems reviewed and are negative. Objective: There were no vitals filed for this visit. There is no height or weight on file to calculate BMI. Physical Exam  Nursing note reviewed. Constitutional:       Appearance: Normal appearance. HENT:      Head: Normocephalic. Eyes:      Pupils: Pupils are equal, round, and reactive to light. Cardiovascular:      Pulses: Normal pulses. Pulmonary:      Effort: Pulmonary effort is normal.   Musculoskeletal:         General: Normal range of motion. Feet:    Skin:     General: Skin is warm and dry. Capillary Refill: Capillary refill takes less than 2 seconds. Neurological:      General: No focal deficit present. Mental Status: She is alert. Psychiatric:         Mood and Affect: Mood normal.           Portions of the record may have been created with voice recognition software. Occasional wrong word or "sound a like" substitutions may have occurred due to the inherent limitations of voice recognition software. Read the chart carefully and recognize, using context, where substitutions have occurred. Contact me with any questions.

## 2024-01-08 ENCOUNTER — OFFICE VISIT (OUTPATIENT)
Dept: FAMILY MEDICINE CLINIC | Facility: CLINIC | Age: 54
End: 2024-01-08
Payer: COMMERCIAL

## 2024-01-08 VITALS
HEART RATE: 68 BPM | BODY MASS INDEX: 26.99 KG/M2 | DIASTOLIC BLOOD PRESSURE: 86 MMHG | OXYGEN SATURATION: 98 % | HEIGHT: 65 IN | SYSTOLIC BLOOD PRESSURE: 148 MMHG | TEMPERATURE: 97.8 F | WEIGHT: 162 LBS

## 2024-01-08 DIAGNOSIS — F33.1 MODERATE EPISODE OF RECURRENT MAJOR DEPRESSIVE DISORDER (HCC): ICD-10-CM

## 2024-01-08 DIAGNOSIS — I10 PRIMARY HYPERTENSION: ICD-10-CM

## 2024-01-08 DIAGNOSIS — B07.0 PLANTAR WART OF RIGHT FOOT: Primary | ICD-10-CM

## 2024-01-08 PROCEDURE — 99214 OFFICE O/P EST MOD 30 MIN: CPT | Performed by: NURSE PRACTITIONER

## 2024-01-08 RX ORDER — IRBESARTAN 150 MG/1
150 TABLET ORAL
Qty: 90 TABLET | Refills: 1 | Status: CANCELLED | OUTPATIENT
Start: 2024-01-08

## 2024-01-08 RX ORDER — IRBESARTAN 150 MG/1
150 TABLET ORAL
Qty: 90 TABLET | Refills: 1 | Status: SHIPPED | OUTPATIENT
Start: 2024-01-08

## 2024-01-08 NOTE — PROGRESS NOTES
Assessment/Plan:    Problem List Items Addressed This Visit     Primary hypertension    Relevant Medications    irbesartan (AVAPRO) 150 mg tablet    Moderate episode of recurrent major depressive disorder (HCC)    Relevant Medications    sertraline (ZOLOFT) 50 mg tablet   Other Visit Diagnoses     Plantar wart of right foot    -  Primary    Relevant Orders    Lesion Destruction (Completed)           Diagnoses and all orders for this visit:    Plantar wart of right foot  -     Lesion Destruction    Moderate episode of recurrent major depressive disorder (HCC)  -     sertraline (ZOLOFT) 50 mg tablet; Take 1 tablet (50 mg total) by mouth daily    Primary hypertension  -     irbesartan (AVAPRO) 150 mg tablet; Take 1 tablet (150 mg total) by mouth daily at bedtime    Other orders  -     Cancel: Biopsy        No problem-specific Assessment & Plan notes found for this encounter.        Subjective:      Patient ID: Caitlin Jarrett is a 53 y.o. female.    Warts  Patient complains of warts. The warts are located on right ball of foot in area of 4th digit. They have been present for a few months. Another wart was removed from adjacent area effectively. It was noted at last visit there was another wart adjacent. The patient denies pain or cellulitic infection symptoms.    Hypertension  This is a chronic problem. The problem is uncontrolled. There are no associated agents to hypertension. Risk factors for coronary artery disease include post-menopausal state. Past treatments include alpha 1 blockers. The current treatment provides moderate improvement. There are no compliance problems.  There is no history of angina, kidney disease or CAD/MI. There is no history of chronic renal disease.   Depression  This is a chronic problem. The symptoms are aggravated by stress. Treatments tried: sertraline. The treatment provided moderate relief.       The following portions of the patient's history were reviewed and updated as appropriate:  "  She has no past medical history on file.,  does not have any pertinent problems on file.,   has a past surgical history that includes  section.,  family history includes Breast cancer in her mother; Depression in her mother; Heart attack in her father; Heart disease in her father; Hyperlipidemia in her father; Hypertension in her father and mother; Kidney disease in her father; Stroke in her father; Uterine cancer in her mother.,   reports that she has been smoking cigarettes. She started smoking about 34 years ago. She has a 34.0 pack-year smoking history. She has never used smokeless tobacco. She reports that she does not currently use alcohol. She reports that she does not use drugs.,  has No Known Allergies..  Current Outpatient Medications   Medication Sig Dispense Refill   • irbesartan (AVAPRO) 150 mg tablet Take 1 tablet (150 mg total) by mouth daily at bedtime 90 tablet 1   • sertraline (ZOLOFT) 50 mg tablet Take 1 tablet (50 mg total) by mouth daily 90 tablet 3     No current facility-administered medications for this visit.            Review of Systems   Skin:         Right plantar foot pain at ball of foot proximal to 4th digit   Psychiatric/Behavioral:  Positive for depression.    All other systems reviewed and are negative.        Objective:  Vitals:    24 1021   BP: 148/86   Pulse: 68   Temp: 97.8 °F (36.6 °C)   TempSrc: Tympanic   SpO2: 98%   Weight: 73.5 kg (162 lb)   Height: 5' 5\" (1.651 m)     Body mass index is 26.96 kg/m².     Physical Exam  Vitals and nursing note reviewed.   Constitutional:       Appearance: Normal appearance. She is well-developed.   HENT:      Head: Normocephalic and atraumatic.      Right Ear: Tympanic membrane, ear canal and external ear normal.      Left Ear: Tympanic membrane, ear canal and external ear normal.      Nose: Nose normal.      Mouth/Throat:      Mouth: Mucous membranes are moist.      Pharynx: Uvula midline.   Eyes:      General: Lids are " normal.      Conjunctiva/sclera: Conjunctivae normal.      Pupils: Pupils are equal, round, and reactive to light.   Neck:      Thyroid: No thyroid mass or thyromegaly.      Vascular: No JVD.      Trachea: Trachea and phonation normal.   Cardiovascular:      Rate and Rhythm: Normal rate and regular rhythm.      Pulses: Normal pulses.      Heart sounds: Normal heart sounds, S1 normal and S2 normal. No murmur heard.     No friction rub. No gallop.   Pulmonary:      Effort: Pulmonary effort is normal.      Breath sounds: Normal breath sounds.   Abdominal:      General: Bowel sounds are normal.      Palpations: Abdomen is soft.      Tenderness: There is no abdominal tenderness.   Genitourinary:     Comments: Deferred   Musculoskeletal:         General: Normal range of motion.      Cervical back: Full passive range of motion without pain, normal range of motion and neck supple.      Right lower leg: No edema.      Left lower leg: No edema.        Feet:    Lymphadenopathy:      Head:      Right side of head: No submental, submandibular, tonsillar, preauricular, posterior auricular or occipital adenopathy.      Left side of head: No submental, submandibular, tonsillar, preauricular, posterior auricular or occipital adenopathy.      Cervical: No cervical adenopathy.   Skin:     General: Skin is warm and dry.      Capillary Refill: Capillary refill takes less than 2 seconds.   Neurological:      General: No focal deficit present.      Mental Status: She is alert and oriented to person, place, and time.      Cranial Nerves: No cranial nerve deficit.      Sensory: Sensation is intact.      Motor: Motor function is intact.      Coordination: Coordination is intact.      Gait: Gait is intact.      Deep Tendon Reflexes: Reflexes are normal and symmetric.   Psychiatric:         Attention and Perception: Attention and perception normal.         Mood and Affect: Mood and affect normal.         Speech: Speech normal.          "Behavior: Behavior normal. Behavior is cooperative.         Thought Content: Thought content normal.         Cognition and Memory: Cognition normal.         Judgment: Judgment normal.       Lesion Destruction    Date/Time: 1/8/2024 10:20 AM    Performed by: ZAHRAA Locke  Authorized by: ZAHRAA Locke  Universal Protocol:  Consent: Verbal consent obtained.  Consent given by: patient  Patient understanding: patient states understanding of the procedure being performed  Patient consent: the patient's understanding of the procedure matches consent given  Procedure consent: procedure consent matches procedure scheduled  Patient identity confirmed: verbally with patient    Procedure Details - Lesion Destruction:     Number of Lesions:  1  Lesion 1:     Body area:  Lower extremity    Lower extremity location:  R foot    Initial size (mm):  3    Final defect size (mm):  0    Malignancy comment:  Plantar wart    Destruction method: surgical removal      Destruction method comment:  4mm punch Bx        Portions of the record may have been created with voice recognition software. Occasional wrong word or \"sound a like\" substitutions may have occurred due to the inherent limitations of voice recognition software. Read the chart carefully and recognize, using context, where substitutions have occurred. Contact me with any questions.  "

## 2024-01-22 ENCOUNTER — OFFICE VISIT (OUTPATIENT)
Dept: FAMILY MEDICINE CLINIC | Facility: CLINIC | Age: 54
End: 2024-01-22
Payer: COMMERCIAL

## 2024-01-22 VITALS
BODY MASS INDEX: 26.99 KG/M2 | OXYGEN SATURATION: 98 % | HEIGHT: 65 IN | SYSTOLIC BLOOD PRESSURE: 138 MMHG | TEMPERATURE: 97.4 F | WEIGHT: 162 LBS | HEART RATE: 88 BPM | DIASTOLIC BLOOD PRESSURE: 84 MMHG

## 2024-01-22 DIAGNOSIS — Z12.31 ENCOUNTER FOR SCREENING MAMMOGRAM FOR BREAST CANCER: ICD-10-CM

## 2024-01-22 DIAGNOSIS — B07.0 PLANTAR WART OF RIGHT FOOT: ICD-10-CM

## 2024-01-22 DIAGNOSIS — Z12.31 ENCOUNTER FOR SCREENING MAMMOGRAM FOR MALIGNANT NEOPLASM OF BREAST: ICD-10-CM

## 2024-01-22 DIAGNOSIS — F17.210 SMOKING GREATER THAN 20 PACK YEARS: ICD-10-CM

## 2024-01-22 DIAGNOSIS — Z12.4 SCREENING FOR CERVICAL CANCER: Primary | ICD-10-CM

## 2024-01-22 PROCEDURE — 99213 OFFICE O/P EST LOW 20 MIN: CPT | Performed by: NURSE PRACTITIONER

## 2024-01-22 NOTE — PROGRESS NOTES
Assessment/Plan:    Problem List Items Addressed This Visit     Smoking greater than 20 pack years    Relevant Orders    CT lung screening program   Other Visit Diagnoses     Screening for cervical cancer    -  Primary    Encounter for screening mammogram for breast cancer        Encounter for screening mammogram for malignant neoplasm of breast        Relevant Orders    Mammo screening bilateral w 3d & cad    Plantar wart of right foot        Relevant Orders    Suture removal (Completed)           Diagnoses and all orders for this visit:    Screening for cervical cancer    Encounter for screening mammogram for breast cancer    Smoking greater than 20 pack years  -     CT lung screening program; Future    Encounter for screening mammogram for malignant neoplasm of breast  -     Mammo screening bilateral w 3d & cad; Future    Plantar wart of right foot  -     Suture removal        No problem-specific Assessment & Plan notes found for this encounter.        Subjective:      Patient ID: Caitlin Jarrett is a 53 y.o. female.    Patient has a punch biopsy removal of a plantar wart at the distal third metatarsal 2 weeks ago.  Cryogenic therapy was unsuccessful prior the patient had multiple wart formations in the general area have been removed by punch biopsy.  Patient denies any signs of infection, drainage, pain.  2 sutures were removed without difficulty.    Suture / Staple Removal  The sutures were placed 11 to 14 days ago. She tried nothing since the wound repair. The treatment provided moderate relief. There has been no drainage from the wound. There is no redness present. There is no swelling present. There is no pain present.       The following portions of the patient's history were reviewed and updated as appropriate:   She has no past medical history on file.,  does not have any pertinent problems on file.,   has a past surgical history that includes  section.,  family history includes Breast cancer in  "her mother; Depression in her mother; Heart attack in her father; Heart disease in her father; Hyperlipidemia in her father; Hypertension in her father and mother; Kidney disease in her father; Stroke in her father; Uterine cancer in her mother.,   reports that she has been smoking cigarettes. She started smoking about 34 years ago. She has a 34.1 pack-year smoking history. She has never used smokeless tobacco. She reports that she does not currently use alcohol. She reports that she does not use drugs.,  has No Known Allergies..  Current Outpatient Medications   Medication Sig Dispense Refill   • irbesartan (AVAPRO) 150 mg tablet Take 1 tablet (150 mg total) by mouth daily at bedtime 90 tablet 1   • sertraline (ZOLOFT) 50 mg tablet Take 1 tablet (50 mg total) by mouth daily 90 tablet 3     No current facility-administered medications for this visit.       Tobacco Cessation Counseling: The patient is sincerely urged to quit consumption of tobacco. She is not ready to quit tobacco.           Review of Systems   Skin:  Positive for wound.   All other systems reviewed and are negative.        Objective:  Vitals:    01/22/24 0730   BP: 138/84   Pulse: 88   Temp: (!) 97.4 °F (36.3 °C)   TempSrc: Tympanic   SpO2: 98%   Weight: 73.5 kg (162 lb)   Height: 5' 5\" (1.651 m)     Body mass index is 26.96 kg/m².     Physical Exam  Vitals and nursing note reviewed.   HENT:      Head: Normocephalic.      Mouth/Throat:      Mouth: Mucous membranes are moist.   Eyes:      Pupils: Pupils are equal, round, and reactive to light.   Cardiovascular:      Rate and Rhythm: Normal rate.   Pulmonary:      Effort: Pulmonary effort is normal.   Musculoskeletal:        Feet:    Skin:     General: Skin is warm and dry.      Capillary Refill: Capillary refill takes less than 2 seconds.   Neurological:      General: No focal deficit present.      Mental Status: She is alert.   Psychiatric:         Mood and Affect: Mood normal.         Suture " "removal    Date/Time: 1/22/2024 7:40 AM    Performed by: ZAHRAA Locke  Authorized by: ZAHRAA Locke  Universal Protocol:  Consent: Verbal consent obtained.  Consent given by: patient  Patient understanding: patient states understanding of the procedure being performed  Patient consent: the patient's understanding of the procedure matches consent given  Patient identity confirmed: verbally with patient      Patient location:  Clinic  Location:     Laterality:  Right    Location:  Lower extremity    Lower extremity location:  Foot    Foot location:  R foot  Procedure details:     Tools used:  Suture removal kit    Wound appearance:  No sign(s) of infection, clean and good wound healing    Number of sutures removed:  2    Number of staples removed:  0  Post-procedure details:     Post-removal:  No dressing applied    Patient tolerance of procedure:  Tolerated well, no immediate complications      Portions of the record may have been created with voice recognition software. Occasional wrong word or \"sound a like\" substitutions may have occurred due to the inherent limitations of voice recognition software. Read the chart carefully and recognize, using context, where substitutions have occurred. Contact me with any questions.  "

## 2024-01-25 ENCOUNTER — TELEPHONE (OUTPATIENT)
Dept: FAMILY MEDICINE CLINIC | Facility: CLINIC | Age: 54
End: 2024-01-25

## 2024-03-05 ENCOUNTER — APPOINTMENT (OUTPATIENT)
Dept: LAB | Facility: CLINIC | Age: 54
End: 2024-03-05
Payer: COMMERCIAL

## 2024-03-05 DIAGNOSIS — Z13.0 SCREENING FOR DEFICIENCY ANEMIA: ICD-10-CM

## 2024-03-05 DIAGNOSIS — Z13.1 SCREENING FOR DIABETES MELLITUS: ICD-10-CM

## 2024-03-05 DIAGNOSIS — Z13.29 SCREENING FOR THYROID DISORDER: ICD-10-CM

## 2024-03-05 DIAGNOSIS — Z13.220 SCREENING FOR LIPID DISORDERS: ICD-10-CM

## 2024-03-05 LAB
ALBUMIN SERPL BCP-MCNC: 4.1 G/DL (ref 3.5–5)
ALP SERPL-CCNC: 64 U/L (ref 34–104)
ALT SERPL W P-5'-P-CCNC: 6 U/L (ref 7–52)
ANION GAP SERPL CALCULATED.3IONS-SCNC: 4 MMOL/L
AST SERPL W P-5'-P-CCNC: 10 U/L (ref 13–39)
BASOPHILS # BLD AUTO: 0.04 THOUSANDS/ÂΜL (ref 0–0.1)
BASOPHILS NFR BLD AUTO: 1 % (ref 0–1)
BILIRUB SERPL-MCNC: 0.52 MG/DL (ref 0.2–1)
BUN SERPL-MCNC: 9 MG/DL (ref 5–25)
CALCIUM SERPL-MCNC: 9.1 MG/DL (ref 8.4–10.2)
CHLORIDE SERPL-SCNC: 105 MMOL/L (ref 96–108)
CHOLEST SERPL-MCNC: 160 MG/DL
CO2 SERPL-SCNC: 31 MMOL/L (ref 21–32)
CREAT SERPL-MCNC: 0.64 MG/DL (ref 0.6–1.3)
EOSINOPHIL # BLD AUTO: 0.11 THOUSAND/ÂΜL (ref 0–0.61)
EOSINOPHIL NFR BLD AUTO: 1 % (ref 0–6)
ERYTHROCYTE [DISTWIDTH] IN BLOOD BY AUTOMATED COUNT: 13.2 % (ref 11.6–15.1)
GFR SERPL CREATININE-BSD FRML MDRD: 102 ML/MIN/1.73SQ M
GLUCOSE P FAST SERPL-MCNC: 88 MG/DL (ref 65–99)
HCT VFR BLD AUTO: 44.9 % (ref 34.8–46.1)
HDLC SERPL-MCNC: 30 MG/DL
HGB BLD-MCNC: 14.3 G/DL (ref 11.5–15.4)
IMM GRANULOCYTES # BLD AUTO: 0.04 THOUSAND/UL (ref 0–0.2)
IMM GRANULOCYTES NFR BLD AUTO: 1 % (ref 0–2)
LDLC SERPL CALC-MCNC: 98 MG/DL (ref 0–100)
LYMPHOCYTES # BLD AUTO: 2.3 THOUSANDS/ÂΜL (ref 0.6–4.47)
LYMPHOCYTES NFR BLD AUTO: 28 % (ref 14–44)
MCH RBC QN AUTO: 30.1 PG (ref 26.8–34.3)
MCHC RBC AUTO-ENTMCNC: 31.8 G/DL (ref 31.4–37.4)
MCV RBC AUTO: 95 FL (ref 82–98)
MONOCYTES # BLD AUTO: 0.58 THOUSAND/ÂΜL (ref 0.17–1.22)
MONOCYTES NFR BLD AUTO: 7 % (ref 4–12)
NEUTROPHILS # BLD AUTO: 5.16 THOUSANDS/ÂΜL (ref 1.85–7.62)
NEUTS SEG NFR BLD AUTO: 62 % (ref 43–75)
NRBC BLD AUTO-RTO: 0 /100 WBCS
PLATELET # BLD AUTO: 315 THOUSANDS/UL (ref 149–390)
PMV BLD AUTO: 9.6 FL (ref 8.9–12.7)
POTASSIUM SERPL-SCNC: 4 MMOL/L (ref 3.5–5.3)
PROT SERPL-MCNC: 6.4 G/DL (ref 6.4–8.4)
RBC # BLD AUTO: 4.75 MILLION/UL (ref 3.81–5.12)
SODIUM SERPL-SCNC: 140 MMOL/L (ref 135–147)
TRIGL SERPL-MCNC: 161 MG/DL
TSH SERPL DL<=0.05 MIU/L-ACNC: 0.92 UIU/ML (ref 0.45–4.5)
WBC # BLD AUTO: 8.23 THOUSAND/UL (ref 4.31–10.16)

## 2024-03-05 PROCEDURE — 36415 COLL VENOUS BLD VENIPUNCTURE: CPT

## 2024-03-05 PROCEDURE — 85025 COMPLETE CBC W/AUTO DIFF WBC: CPT

## 2024-03-05 PROCEDURE — 84443 ASSAY THYROID STIM HORMONE: CPT

## 2024-03-05 PROCEDURE — 80061 LIPID PANEL: CPT

## 2024-03-05 PROCEDURE — 80053 COMPREHEN METABOLIC PANEL: CPT

## 2024-03-07 ENCOUNTER — OFFICE VISIT (OUTPATIENT)
Dept: FAMILY MEDICINE CLINIC | Facility: CLINIC | Age: 54
End: 2024-03-07
Payer: COMMERCIAL

## 2024-03-07 VITALS
HEIGHT: 65 IN | DIASTOLIC BLOOD PRESSURE: 84 MMHG | WEIGHT: 162.4 LBS | BODY MASS INDEX: 27.06 KG/M2 | SYSTOLIC BLOOD PRESSURE: 128 MMHG | HEART RATE: 73 BPM | OXYGEN SATURATION: 98 % | TEMPERATURE: 96.7 F

## 2024-03-07 DIAGNOSIS — Z00.00 ANNUAL PHYSICAL EXAM: Primary | ICD-10-CM

## 2024-03-07 DIAGNOSIS — F33.1 MODERATE EPISODE OF RECURRENT MAJOR DEPRESSIVE DISORDER (HCC): ICD-10-CM

## 2024-03-07 DIAGNOSIS — I10 PRIMARY HYPERTENSION: ICD-10-CM

## 2024-03-07 PROCEDURE — 99396 PREV VISIT EST AGE 40-64: CPT | Performed by: NURSE PRACTITIONER

## 2024-03-07 NOTE — PROGRESS NOTES
ADULT ANNUAL PHYSICAL  [facility]    NAME: Caitlin Jarrett  AGE: 53 y.o. SEX: female  : 1970     DATE: 3/7/2024     Assessment and Plan:     Problem List Items Addressed This Visit     Primary hypertension    Moderate episode of recurrent major depressive disorder (HCC)   Other Visit Diagnoses     Annual physical exam    -  Primary            Immunizations and preventive care screenings were discussed with patient today. Appropriate education was printed on patient's after visit summary.    Counseling:  Alcohol/drug use: discussed moderation in alcohol intake, the recommendations for healthy alcohol use, and avoidance of illicit drug use.  Dental Health: discussed importance of regular tooth brushing, flossing, and dental visits.  Injury prevention: discussed safety/seat belts, safety helmets, smoke detectors, carbon dioxide detectors, and smoking near bedding or upholstery.  Sexual health: discussed sexually transmitted diseases, partner selection, use of condoms, avoidance of unintended pregnancy, and contraceptive alternatives.  Exercise: the importance of regular exercise/physical activity was discussed. Recommend exercise 3-5 times per week for at least 30 minutes.       Tobacco Cessation Counseling: Tobacco cessation counseling was provided. The patient is sincerely urged to quit consumption of tobacco. She is not ready to quit tobacco.         Return in about 6 months (around 2024).     Chief Complaint:     Chief Complaint   Patient presents with   • Physical Exam     Annual Physical  Interested in Cologuard        History of Present Illness:     Adult Annual Physical   Patient here for a comprehensive physical exam. The patient reports no problems.    Hypertension  This is a chronic problem. The problem is controlled. There are no associated agents to hypertension. Risk factors for coronary artery disease include post-menopausal  state and smoking/tobacco exposure. Past treatments include angiotensin blockers. The current treatment provides moderate improvement. There are no compliance problems.  There is no history of angina, kidney disease or CAD/MI. There is no history of chronic renal disease.   Depression  This is a chronic problem. The symptoms are aggravated by stress. Treatments tried: Sertraline. The treatment provided moderate relief.       Diet and Physical Activity  Diet/Nutrition:  Higher carb diet, with vegetables and red meats .   Exercise: no formal exercise.      Depression Screening  PHQ-2/9 Depression Screening         General Health  Sleep: gets 4-6 hours of sleep on average.   Hearing: normal - bilateral.  Vision: no vision problems and most recent eye exam <1 year ago.   Dental: regular dental visits, brushes teeth three times daily, and does not floss.       /GYN Health  Follows with gynecology? no   Patient is: postmenopausal  Last menstrual period:     Advanced Care Planning  Do you have an advanced directive? no  Do you have a durable medical power of ? no  ACP document given to the patient? yes     Review of Systems:     Review of Systems   Psychiatric/Behavioral:  Positive for depression.    All other systems reviewed and are negative.     Past Medical History:     History reviewed. No pertinent past medical history.   Past Surgical History:     Past Surgical History:   Procedure Laterality Date   •  SECTION      1992 and       Social History:     Social History     Socioeconomic History   • Marital status: Single     Spouse name: None   • Number of children: None   • Years of education: None   • Highest education level: None   Occupational History   • None   Tobacco Use   • Smoking status: Every Day     Current packs/day: 1.00     Average packs/day: 1 pack/day for 34.2 years (34.2 ttl pk-yrs)     Types: Cigarettes     Start date:    • Smokeless tobacco: Never   Vaping Use   •  "Vaping status: Never Used   Substance and Sexual Activity   • Alcohol use: Not Currently   • Drug use: Never   • Sexual activity: None   Other Topics Concern   • None   Social History Narrative   • None     Social Determinants of Health     Financial Resource Strain: Not on file   Food Insecurity: Not on file   Transportation Needs: Not on file   Physical Activity: Not on file   Stress: Not on file   Social Connections: Not on file   Intimate Partner Violence: Not on file   Housing Stability: Not on file      Family History:     Family History   Problem Relation Age of Onset   • Hypertension Mother    • Depression Mother    • Breast cancer Mother    • Uterine cancer Mother    • Hyperlipidemia Father    • Hypertension Father    • Kidney disease Father    • Heart attack Father    • Stroke Father    • Heart disease Father       Current Medications:     Current Outpatient Medications   Medication Sig Dispense Refill   • irbesartan (AVAPRO) 150 mg tablet Take 1 tablet (150 mg total) by mouth daily at bedtime 90 tablet 1   • sertraline (ZOLOFT) 50 mg tablet Take 1 tablet (50 mg total) by mouth daily 90 tablet 3     No current facility-administered medications for this visit.      Allergies:     No Known Allergies   Physical Exam:     /84   Pulse 73   Temp (!) 96.7 °F (35.9 °C) (Tympanic)   Ht 5' 5\" (1.651 m)   Wt 73.7 kg (162 lb 6.4 oz)   SpO2 98%   BMI 27.02 kg/m²     Physical Exam  Vitals and nursing note reviewed.   Constitutional:       Appearance: Normal appearance. She is well-developed.   HENT:      Head: Normocephalic and atraumatic.      Right Ear: Tympanic membrane, ear canal and external ear normal.      Left Ear: Tympanic membrane, ear canal and external ear normal.      Nose: Nose normal.      Mouth/Throat:      Mouth: Mucous membranes are moist.   Eyes:      General: Lids are normal. Vision grossly intact.      Conjunctiva/sclera: Conjunctivae normal.      Pupils: Pupils are equal, round, and " reactive to light.   Cardiovascular:      Rate and Rhythm: Normal rate and regular rhythm.      Pulses: Normal pulses.      Heart sounds: Normal heart sounds, S1 normal and S2 normal.      No friction rub. No gallop. No S3 sounds.   Pulmonary:      Effort: Pulmonary effort is normal.      Breath sounds: Normal breath sounds.   Abdominal:      General: Bowel sounds are normal.      Palpations: Abdomen is soft.      Tenderness: There is no abdominal tenderness.   Genitourinary:     Comments: Deferred  Musculoskeletal:         General: Normal range of motion.      Cervical back: Normal range of motion and neck supple.      Right lower leg: No edema.      Left lower leg: No edema.   Lymphadenopathy:      Cervical: No cervical adenopathy.   Skin:     General: Skin is warm and dry.      Capillary Refill: Capillary refill takes less than 2 seconds.   Neurological:      General: No focal deficit present.      Mental Status: She is alert and oriented to person, place, and time.      Motor: Motor function is intact.      Gait: Gait is intact.   Psychiatric:         Attention and Perception: Attention and perception normal.         Mood and Affect: Mood and affect normal.         Speech: Speech normal.         Behavior: Behavior normal. Behavior is cooperative.         Thought Content: Thought content normal.         Cognition and Memory: Cognition and memory normal.         Judgment: Judgment normal.          Appointment on 03/05/2024   Component Date Value Ref Range Status   • Cholesterol 03/05/2024 160  See Comment mg/dL Final    Cholesterol:         Pediatric <18 Years        Desirable          <170 mg/dL      Borderline High    170-199 mg/dL      High               >=200 mg/dL        Adult >=18 Years            Desirable         <200 mg/dL      Borderline High   200-239 mg/dL      High              >239 mg/dL     • Triglycerides 03/05/2024 161 (H)  See Comment mg/dL Final    Triglyceride:     0-9Y            <75mg/dL      10Y-17Y         <90 mg/dL       >=18Y     Normal          <150 mg/dL     Borderline High 150-199 mg/dL     High            200-499 mg/dL        Very High       >499 mg/dL    Specimen collection should occur prior to Metamizole administration due to the potential for falsely depressed results.   • HDL, Direct 03/05/2024 30 (L)  >=50 mg/dL Final   • LDL Calculated 03/05/2024 98  0 - 100 mg/dL Final    LDL Cholesterol:     Optimal           <100 mg/dl     Near Optimal      100-129 mg/dl     Above Optimal       Borderline High 130-159 mg/dl       High            160-189 mg/dl       Very High       >189 mg/dl         This screening LDL is a calculated result.   It does not have the accuracy of the Direct Measured LDL in the monitoring of patients with hyperlipidemia and/or statin therapy.   Direct Measure LDL (IJX818) must be ordered separately in these patients.   • WBC 03/05/2024 8.23  4.31 - 10.16 Thousand/uL Final   • RBC 03/05/2024 4.75  3.81 - 5.12 Million/uL Final   • Hemoglobin 03/05/2024 14.3  11.5 - 15.4 g/dL Final   • Hematocrit 03/05/2024 44.9  34.8 - 46.1 % Final   • MCV 03/05/2024 95  82 - 98 fL Final   • MCH 03/05/2024 30.1  26.8 - 34.3 pg Final   • MCHC 03/05/2024 31.8  31.4 - 37.4 g/dL Final   • RDW 03/05/2024 13.2  11.6 - 15.1 % Final   • MPV 03/05/2024 9.6  8.9 - 12.7 fL Final   • Platelets 03/05/2024 315  149 - 390 Thousands/uL Final   • nRBC 03/05/2024 0  /100 WBCs Final   • Neutrophils Relative 03/05/2024 62  43 - 75 % Final   • Immat GRANS % 03/05/2024 1  0 - 2 % Final   • Lymphocytes Relative 03/05/2024 28  14 - 44 % Final   • Monocytes Relative 03/05/2024 7  4 - 12 % Final   • Eosinophils Relative 03/05/2024 1  0 - 6 % Final   • Basophils Relative 03/05/2024 1  0 - 1 % Final   • Neutrophils Absolute 03/05/2024 5.16  1.85 - 7.62 Thousands/µL Final   • Immature Grans Absolute 03/05/2024 0.04  0.00 - 0.20 Thousand/uL Final   • Lymphocytes Absolute 03/05/2024 2.30  0.60 - 4.47 Thousands/µL Final   •  Monocytes Absolute 03/05/2024 0.58  0.17 - 1.22 Thousand/µL Final   • Eosinophils Absolute 03/05/2024 0.11  0.00 - 0.61 Thousand/µL Final   • Basophils Absolute 03/05/2024 0.04  0.00 - 0.10 Thousands/µL Final   • Sodium 03/05/2024 140  135 - 147 mmol/L Final   • Potassium 03/05/2024 4.0  3.5 - 5.3 mmol/L Final   • Chloride 03/05/2024 105  96 - 108 mmol/L Final   • CO2 03/05/2024 31  21 - 32 mmol/L Final   • ANION GAP 03/05/2024 4  mmol/L Final   • BUN 03/05/2024 9  5 - 25 mg/dL Final   • Creatinine 03/05/2024 0.64  0.60 - 1.30 mg/dL Final    Standardized to IDMS reference method   • Glucose, Fasting 03/05/2024 88  65 - 99 mg/dL Final   • Calcium 03/05/2024 9.1  8.4 - 10.2 mg/dL Final   • AST 03/05/2024 10 (L)  13 - 39 U/L Final   • ALT 03/05/2024 6 (L)  7 - 52 U/L Final    Specimen collection should occur prior to Sulfasalazine administration due to the potential for falsely depressed results.    • Alkaline Phosphatase 03/05/2024 64  34 - 104 U/L Final   • Total Protein 03/05/2024 6.4  6.4 - 8.4 g/dL Final   • Albumin 03/05/2024 4.1  3.5 - 5.0 g/dL Final   • Total Bilirubin 03/05/2024 0.52  0.20 - 1.00 mg/dL Final    Use of this assay is not recommended for patients undergoing treatment with eltrombopag due to the potential for falsely elevated results.  N-acetyl-p-benzoquinone imine (metabolite of Acetaminophen) will generate erroneously low results in samples for patients that have taken an overdose of Acetaminophen.   • eGFR 03/05/2024 102  ml/min/1.73sq m Final   • TSH 3RD GENERATON 03/05/2024 0.919  0.450 - 4.500 uIU/mL Final    The recommended reference ranges for TSH during pregnancy are as follows:   First trimester 0.100 to 2.500 uIU/mL   Second trimester  0.200 to 3.000 uIU/mL   Third trimester 0.300 to 3.000 uIU/m    Note: Normal ranges may not apply to patients who are transgender, non-binary, or whose legal sex, sex at birth, and gender identity differ.  Adult TSH (3rd generation) reference range  follows the recommended guidelines of the [employer], January, 2020.       I have reviewed all the lab results. There are some abnormalities that are not critical to the patient's health, I have discussed these in person at this office appointment.    [id number] Locke  [facility]

## 2024-03-07 NOTE — PATIENT INSTRUCTIONS
CARBOHYDRATES  As a carbohydrate is digested through our bodies it becomes a sugar.      There are TWO main things I want you to know about CARBOHYDRATES:     1. NUMBER     How many carbohydrates are in each serving of food you are about to eat matters to weight loss/gain, diabetes control, and sugar levels.  A food product is in the LOW carbohydrate level if it has less than 15grams carbohydrates per serving.  These are always good choices in general for a snack.  A meal can include anywhere from 15-45 grams carbohydrates in the meal.      TIP: Sugar Free foods contain carbohydrates which become sugar in the body.  If you are going to consider eating sugar free foods, you MUST follow serving size carefully.  These foods will still result in high glucose levels.      2. TYPE     The type of carbohydrate is VERY important.  A slice of bread (white or wheat) will have same amount of carbohydrates but both break down at different paces in the body.  Carbohydrates that are more complex like Rye, whole wheat, and multi grain process slower through our bodies, therefore, making sugar levels rise slowly and steady and over long period of time.  WHITE carbohydrates such as white bread, white pasta and white rice digest quickly in your body and raise your glucose levels fast just like eating a candy bar.     TIP: Read the INGREDIENTS on each item to make sure the first ingredient is WHOLE WHEAT or Rye  (not enriched white flour) rather than reading front label of the product in order to verify whole wheat product.             Simple carbohydrates digest through your body QUICKLY causing the conversion of carbohydrates to become sugars if you are not using them immediately for energy.  Complex carbohydrates will become sugars SLOWLY over time as your body breaks them down in your digestive system.  Below is a graph demonstrating these two concepts.  The rate of sugar breakdown can easily affect your energy, metabolism,  ability to gain or lose weight and more.  The goal is a slow and steady release of sugar into your body and to avoid sugar spikes in order to feel your best and manage your health.        Simple Carbohydrates: Candy bar, white bread, white pasta, white rice, white flour, cookies, sweets, cakes, corn, string beans etc               A little information about carbs ...   ·         Try for a low carbohydrate diet. This means less than 100 grams per day.   ·         Try to get a high amount of protein per day - shoot for 60-70 grams per day.  ·         To really lose weight you may need to try an ultra low carb diet - this means 10-15 grams per meal. And 5 - 10 grams per snack. This is usually under the supervision of a physician weight loss program.   ·         Carbohydrates are in starches and turn to sugars. Lower your intake of bread, pasta, potatoes, rice. Never drink your carbs - switch to water. No juice or soda.   ·         When you do consume carbs, try for high fiber choices like fruits and veggies. Try for complex carbohydrates found in oats and whole grains. A daily fiber supplement can also be helpful.      Here is a lot more information …     Understanding Carbohydrates  A car needs the right type of fuel to run. And you need the right kind of food to function. To keep your energy level up, your body needs food that has carbohydrates. But carbohydrates raise blood sugar levels higher and faster than other kinds of food.      Starches - AVOID   Starches are found in grains, some vegetables, and beans. Grain products include bread, pasta, cereal, and tortillas. Starchy vegetables include potatoes, peas, corn, lima beans, yams, and squash. Kidney beans, headley beans, black beans, garbanzo beans, and lentils also contain starches.  Sugars - AVOID  Sugars are found naturally in many foods. Or sugar can be added. Foods that contain natural sugar include fruits and fruit juices, dairy products, honey, and  molasses. Added sugars are found in most desserts, processed foods, candy, regular soda, and fruit drinks. These are very helpful for treating low blood sugar, or hypoglycemia. They provide sugar quickly.  Fiber - A BETTER CHOICE  Fiber comes from plant foods. Most fiber isn’t digested by the body. Instead of raising blood sugar levels like other carbohydrates, it actually stops blood sugar from rising too fast. Fiber is found in fruits, vegetables, whole grains, beans, peas, and many nuts.  Carb counting  It’s important to keep track of the amount of carbohydrates you eat. This can help you keep the right balance of physical activity and medicine. The amount of carbohydrates needed will vary for each person. It depends on many things such as your health, the medicines you take, and how active you are. You may start with around 45 to 60 grams of carbohydrate per meal, depending on your situation. Counting your carbohydrates is a good way to control how many you eat. You can do this by keeping a food diary. There are great apps to help with this too like My Fitness Pal.      Carbohydrates come from a variety of foods. These include grains, starchy vegetables, fruit, milk, beans, and snack foods. You can either count carbohydrate grams or carbohydrate servings. When you count carbohydrate servings, 1 carbohydrate serving = 15 grams of carbohydrates.     Here are some examples of foods containing about 15 grams of carbohydrates (1 serving of carbohydrates):  ·      1/2 cup of canned or frozen fruit  ·      A small piece of fresh fruit (4 ounces)  ·      1 slice of bread  ·      1/2 cup of oatmeal  ·      1/3 cup of rice  ·      4 to 6 crackers  ·      1/2 English muffin  ·      1/2 cup of black beans  ·      1/4 of a large baked potato (3 ounces)  ·      2/3 cup of plain fat-free yogurt  ·      1 cup of soup  ·      1/2 cup of casserole  ·      6 chicken nuggets  ·      2-inch square brownie or cake without frosting  ·       2 small cookies  ·      1/2 cup of ice cream or sherbet     Carb counting is easier when food labels are available. Look at the label to see how many grams of total carbohydrates the food contains. Then you can figure out how much you should eat.  It’s also important to be consistent with the amount and time you eat when taking a fixed dose of diabetes medicine.     Make your meal plan  A meal plan gives guidelines for the types and amounts of food you should eat.  Watch serving sizes  Your meal plan will group foods by servings. To learn how much a serving is, start by measuring food portions at each meal. Soon you’ll know what a serving looks like on your plate. A quick rule is a serving size of meat is about the size of your fist.   Eat from all the food groups  The basis of a healthy meal plan is variety (eating lots of different foods). Choose lean meats, fresh fruits and vegetables, whole grains, and low-fat or nonfat dairy products. Eating a wide variety of foods provides the nutrients your body needs. It can also keep you from getting bored with your meal plan.  Learn about carbohydrates, fats, and protein  ·      Carbohydrates are starches, sugars, and fiber. They are found in many foods, including fruit, bread, pasta, milk, and sweets. Of all the foods you eat, carbohydrates have the most effect on your blood sugar.   ·      Fats have the most calories. They also have the most effect on your weight and your risk of heart disease. It’s important to control your weight and protect your heart. Foods that are high in fat include whole milk, cheese, snack foods, and desserts.  ·      Protein is important for building and repairing muscles and bones. Choose low-fat protein sources, such as fish, egg whites, and skinless chicken.  Reduce liquid sugars  Extra calories from sodas, sports drinks, and fruit drinks make it hard to keep blood sugar in range. Cut as many liquid sugars from your meal plan as you  can.  This includes most fruit juices, which are often high in natural or added sugar. Instead, drink plenty of water and other sugar-free beverages.  Eat less fat  If you need to lose weight, try to reduce the amount of fat in your diet. This can also help lower your cholesterol level to keep blood vessels healthier. Cut fat by using only small amounts of liquid oil for cooking. Read food labels carefully to avoid foods with unhealthy trans fats.  Timing your meals  When it comes to blood sugar control, when you eat is as important as what you eat. You may need to eat several small meals spaced evenly throughout the day to stay in your target range. So don’t skip breakfast or wait until late in the day to get most of your calories. Doing so can cause your blood sugar to rise too high or fall too low.         Understanding Carbohydrates, Fats, and Protein  Food is a source of fuel and nourishment for your body. It’s also a source of pleasure. Having diabetes doesn’t mean you have to eat special foods or give up desserts. Instead, your dietitian can show you how to plan meals to suit your body. To start, learn how different foods affect blood sugar.     Carbohydrates  Carbohydrates are the main source of fuel for the body. Carbohydrates raise blood sugar. Many people think carbohydrates are only found in pasta or bread. But carbohydrates are actually in many kinds of foods:  ·      Sugars occur naturally in foods such as fruit, milk, honey, and molasses. Sugars can also be added to many foods, from cereals and yogurt to candy and desserts. Sugars raise blood sugar.  ·      Starches are found in bread, cereals, pasta, and dried beans. They’re also found in corn, peas, potatoes, yam, acorn squash, and butternut squash. Starches also raise blood sugar.   ·      Fiber is found in foods such as vegetables, fruits, beans, and whole grains. Unlike other carbs, fiber isn’t digested or absorbed. So it doesn’t raise blood  sugar. In fact, fiber can help keep blood sugar from rising too fast. It also helps keep blood cholesterol at a healthy level.     Did you know?  Even though carbohydrates raise blood sugar, it’s best to have some in every meal. They are an important part of a healthy diet.      Fat  Fat is an energy source that can be stored until needed. Fat does not raise blood sugar. However, it can raise blood cholesterol, increasing the risk of heart disease. Fat is also high in calories, which can cause weight gain. Not all types of fat are the same.  More Healthy:  ·      Monounsaturated fats are mostly found in vegetable oils, such as olive, canola, and peanut oils. They are also found in avocados and some nuts. Monounsaturated fats are healthy for your heart. That’s because they lower LDL (unhealthy) cholesterol.  ·      Polyunsaturated fats are mostly found in vegetable oils, such as corn, safflower, and soybean oils. They are also found in some seeds, nuts, and fish. Polyunsaturated fats lower LDL (unhealthy) cholesterol. So, choosing them instead of saturated fats is healthy for your heart. Certain unsaturated fats can help lower triglycerides.   Less Healthy:  ·      Saturated fats are found in animal products, such as meat, poultry, whole milk, lard, and butter. Saturated fats raise LDL cholesterol and are not healthy for your heart.  ·      Hydrogenated oils and trans fats are formed when vegetable oils are processed into solid fats. They are found in many processed foods. Hydrogenated oils and trans fats raise LDL cholesterol and lower HDL (healthy) cholesterol. They are not healthy for your heart.  Protein  Protein helps the body build and repair muscle and other tissue. Protein has little or no effect on blood sugar. However, many foods that contain protein also contain saturated fat. By choosing low-fat protein sources, you can get the benefits of protein without the extra fat:  ·      Plant protein is found in  dry beans and peas, nuts, and soy products, such as tofu and soymilk. These sources tend to be cholesterol-free and low in saturated fat.  ·      Animal protein is found in fish, poultry, meat, cheese, milk, and eggs. These contain cholesterol and can be high in saturated fat. Aim for lean, lower-fat choices.     Reading food labels  Pay close attention to food labels. The information on them will help you choose healthy foods that make managing your blood sugar easier. Look for the Nutrition Facts label on packaged foods. This label tells you how many carbohydrates and how much sugar, fat, and fiber are in each serving. Then you can decide whether the food fits your meal plan.      Reading food labels helps you keep track of your carbohydrate intake. Remember to pay attention to serving sizes. If you eat this entire box, he will have eaten 34 grams of carbohydrate.   ·      Serving size: This number is very important and tells you how much of the food makes up a single serving. If you eat more than one serving, all other numbers, like calories and carbohydrates, also increase.  ·      Total carbohydrates: This number tells you how much carbohydrate is in each serving. If you are carb counting, this number will help you fit the food into your meal plan. Also, keep in mind the number of servings you eat. If  you eat two servings, you’ll need to double the amount of carbohydrates listed on the box.   ·      Sugars: This number includes both natural and added sugars. Sugars count as part of your carbohydrate intake, and are included in the total carbohydrate number on the label.   ·      Fat: This number tells you the total amount of fat in each serving. Watch out for saturated fats, which raise cholesterol. Limit fats, especially if you are trying to lose weight.  ·      Trans fat: This number tells you if the food includes trans fat. Liquid oils made into a solid fat, such as margarine, have a lot of trans fat. Trans  "fat is bad for the heart. Try to avoid foods containing trans fat.  ·      Dietary fiber: This number tells you how much of the carbohydrate in the food is fiber. Foods high in fiber are healthy. They also help keep blood sugar levels steady.  Learning portion sizes  Portion control is an important part of healthy eating. How much food you eat affects your blood sugar.  And until you learn what portion sizes look like, use measuring cups and spoons to be sure portions are accurate.     Adapted from:  © 5602-0603 [employer]. [address] [place] [address], [place] [address]. All rights reserved. This information is not intended as a substitute for professional medical care. Always follow your healthcare professional's instructions.                Calm is an jose - this has soothing background sounds like ocean sounds and can talk you through meditation to help you sleep. This jose also has bedtime stories.     HeadSpace is an jose to help with meditation and relaxation.      \"sleep with me\" is a podcast you can try -a nice boring voice telling you a boring story to help you sleep.     Sleep Hygiene Tips  The most common cause of insomnia is a change in your daily routine. For example,  traveling, change in work hours, disruption of other behaviors (eating, exercise, leisure,  etc.), and relationship conflicts can all cause sleep problems. Paying attention to good  sleep hygiene is the most important thing you can do to maintain good sleep.     Do:  1. Go to bed at the same time each day.  2. Get up from bed at the same time each day.  3. Get regular exercise each day, preferably in the morning. There is good evidence that  regular exercise improves restful sleep. This includes stretching and aerobic exercise.  4. Get regular exposure to outdoor or bright lights, especially in the late afternoon.  5. Keep the temperature in your bedroom comfortable.  6. Keep the bedroom quiet when sleeping.  7. Keep the bedroom " dark enough to facilitate sleep.  8. Use your bed only for sleep and sex.  9. Take medications as directed. It is helpful to take prescribed sleeping pills 1 hour  before bedtime, so they are causing drowsiness when you lie down, or 10 hours  before getting up, to avoid daytime drowsiness.  10. Use a relaxation exercise just before going to sleep.  o Muscle relaxation, imagery, massage, warm bath, etc.  11. Keep your feet and hands warm. Wear warm socks and/or mittens or gloves to bed.     Don't:  1. Exercise just before going to bed.  2. Engage in stimulating activity just before bed, such as playing a competitive game,  watching an exciting program on television or movie, or having an important  discussion with a loved one.  3. Have caffeine in the evening (coffee, many teas, chocolate, sodas, etc.) .  4. Read or watch television in bed.  5. Use alcohol to help you sleep.  6. Go to bed too hungry or too full.  7. Take another person's sleeping pills.  8. Take over-the-counter sleeping pills, without your doctor's knowledge. Tolerance can  develop rapidly with these medications. Diphenhydramine (an ingredient commonly  found in over-the-counter sleep meds) can have serious side effects for elderly patients.  9. Take daytime naps.  10. Command yourself to go to sleep. This only makes your mind and body more alert.     If you lie in bed awake for more than 20-30 minutes, get up, go to a different room (or different part of the bedroom), participate in a quiet activity (e.g. non-excitable reading or television),and then return to bed when you feel sleepy. Do this as many times during the night as needed.

## 2024-06-11 ENCOUNTER — OFFICE VISIT (OUTPATIENT)
Dept: FAMILY MEDICINE CLINIC | Facility: CLINIC | Age: 54
End: 2024-06-11
Payer: COMMERCIAL

## 2024-06-11 VITALS
BODY MASS INDEX: 27.99 KG/M2 | DIASTOLIC BLOOD PRESSURE: 88 MMHG | HEART RATE: 82 BPM | SYSTOLIC BLOOD PRESSURE: 136 MMHG | TEMPERATURE: 96.9 F | OXYGEN SATURATION: 96 % | WEIGHT: 168 LBS | HEIGHT: 65 IN

## 2024-06-11 DIAGNOSIS — K04.7 DENTAL ABSCESS: ICD-10-CM

## 2024-06-11 DIAGNOSIS — K04.7 DENTAL INFECTION: Primary | ICD-10-CM

## 2024-06-11 PROCEDURE — 99213 OFFICE O/P EST LOW 20 MIN: CPT | Performed by: NURSE PRACTITIONER

## 2024-06-11 RX ORDER — PENICILLIN V POTASSIUM 500 MG/1
500 TABLET ORAL EVERY 6 HOURS SCHEDULED
Qty: 40 TABLET | Refills: 0 | Status: SHIPPED | OUTPATIENT
Start: 2024-06-11 | End: 2024-06-21

## 2024-06-11 NOTE — PROGRESS NOTES
Assessment/Plan:    Problem List Items Addressed This Visit    None  Visit Diagnoses     Dental infection    -  Primary    Relevant Medications    benzocaine (ORAJEL) 10 % mucosal gel    penicillin V potassium (VEETID) 500 mg tablet    Other Relevant Orders    Ambulatory Referral to Oral Maxillofacial Surgery    Dental abscess        Relevant Medications    benzocaine (ORAJEL) 10 % mucosal gel    penicillin V potassium (VEETID) 500 mg tablet    Other Relevant Orders    Ambulatory Referral to Oral Maxillofacial Surgery             Diagnoses and all orders for this visit:    Dental infection  -     Ambulatory Referral to Oral Maxillofacial Surgery; Future  -     benzocaine (ORAJEL) 10 % mucosal gel; Apply 1 Application to the mouth or throat 3 (three) times a day as needed for mucositis  -     penicillin V potassium (VEETID) 500 mg tablet; Take 1 tablet (500 mg total) by mouth every 6 (six) hours for 10 days    Dental abscess  -     Ambulatory Referral to Oral Maxillofacial Surgery; Future  -     benzocaine (ORAJEL) 10 % mucosal gel; Apply 1 Application to the mouth or throat 3 (three) times a day as needed for mucositis  -     penicillin V potassium (VEETID) 500 mg tablet; Take 1 tablet (500 mg total) by mouth every 6 (six) hours for 10 days        No problem-specific Assessment & Plan notes found for this encounter.        Subjective:      Patient ID: Caitlin Jarrett is a 54 y.o. female.    Patient presents with:  Dental Pain: Poss tooth infection, upper L side has a bump.  Pt looking for to see if [facility] has an oral surgeon.  [facility] charging over 15,000  After insurance,.    [facility] provider prescribed her Augmentin which she states improved her symptoms.  She has been gargling and rinsing with Listerine.  She has been unable to get back into [facility] or to an oral surgeon.  She is asking for referral to [facility] oral maxilla surgery and another antibiotic secondary to gingival swelling and lump of  the lingual side around #14.      Dental Pain   This is a recurrent problem. The current episode started more than 1 month ago. The problem has been waxing and waning. The pain is at a severity of 3/10. Pertinent negatives include no difficulty swallowing, facial pain, oral bleeding or sinus pressure. She has tried ice and acetaminophen for the symptoms. The treatment provided mild relief.       The following portions of the patient's history were reviewed and updated as appropriate:   She has no past medical history on file.,  does not have any pertinent problems on file.,   has a past surgical history that includes  section.,  family history includes Breast cancer in her mother; Depression in her mother; Heart attack in her father; Heart disease in her father; Hyperlipidemia in her father; Hypertension in her father and mother; Kidney disease in her father; Stroke in her father; Uterine cancer in her mother.,   reports that she has been smoking cigarettes. She started smoking about 34 years ago. She has a 34.4 pack-year smoking history. She has never used smokeless tobacco. She reports that she does not currently use alcohol. She reports that she does not use drugs.,  has No Known Allergies..  Current Outpatient Medications   Medication Sig Dispense Refill   • benzocaine (ORAJEL) 10 % mucosal gel Apply 1 Application to the mouth or throat 3 (three) times a day as needed for mucositis 5.3 g 0   • irbesartan (AVAPRO) 150 mg tablet Take 1 tablet (150 mg total) by mouth daily at bedtime 90 tablet 1   • penicillin V potassium (VEETID) 500 mg tablet Take 1 tablet (500 mg total) by mouth every 6 (six) hours for 10 days 40 tablet 0   • sertraline (ZOLOFT) 50 mg tablet Take 1 tablet (50 mg total) by mouth daily 90 tablet 3     No current facility-administered medications for this visit.            Review of Systems   HENT:  Positive for dental problem. Negative for sinus pressure.    All other systems reviewed and  "are negative.        Objective:  Vitals:    06/11/24 0728 06/11/24 0751   BP: (!) 190/90 136/88   Pulse: 82    Temp: (!) 96.9 °F (36.1 °C)    TempSrc: Tympanic    SpO2: 96%    Weight: 76.2 kg (168 lb)    Height: 5' 5\" (1.651 m)      Body mass index is 27.96 kg/m².     Physical Exam  Vitals and nursing note reviewed.   Constitutional:       Appearance: Normal appearance.   HENT:      Mouth/Throat:      Mouth: Mucous membranes are moist.      Dentition: Dental tenderness, gingival swelling and dental abscesses present.     Cardiovascular:      Rate and Rhythm: Normal rate.   Pulmonary:      Effort: Pulmonary effort is normal.   Neurological:      General: No focal deficit present.      Mental Status: She is alert.   Psychiatric:         Mood and Affect: Mood normal.           Portions of the record may have been created with voice recognition software. Occasional wrong word or \"sound a like\" substitutions may have occurred due to the inherent limitations of voice recognition software. Read the chart carefully and recognize, using context, where substitutions have occurred. Contact me with any questions.  "

## 2024-06-25 ENCOUNTER — VBI (OUTPATIENT)
Dept: ADMINISTRATIVE | Facility: OTHER | Age: 54
End: 2024-06-25

## 2024-06-25 NOTE — TELEPHONE ENCOUNTER
06/25/24 1:47 PM     Chart reviewed for [facility]: Colonoscopy ; nothing is submitted to the patient's insurance at this time.     Tiffanie Montalvo   [facility]

## 2024-07-01 ENCOUNTER — TELEPHONE (OUTPATIENT)
Age: 54
End: 2024-07-01

## 2024-07-01 ENCOUNTER — OFFICE VISIT (OUTPATIENT)
Dept: URGENT CARE | Facility: CLINIC | Age: 54
End: 2024-07-01
Payer: COMMERCIAL

## 2024-07-01 VITALS
HEART RATE: 82 BPM | TEMPERATURE: 97.9 F | SYSTOLIC BLOOD PRESSURE: 173 MMHG | OXYGEN SATURATION: 99 % | RESPIRATION RATE: 18 BRPM | DIASTOLIC BLOOD PRESSURE: 83 MMHG

## 2024-07-01 DIAGNOSIS — K04.7 ABSCESSED TOOTH: Primary | ICD-10-CM

## 2024-07-01 DIAGNOSIS — K04.7 DENTAL ABSCESS: Primary | ICD-10-CM

## 2024-07-01 PROCEDURE — 99213 OFFICE O/P EST LOW 20 MIN: CPT | Performed by: PHYSICIAN ASSISTANT

## 2024-07-01 RX ORDER — PENICILLIN V POTASSIUM 500 MG/1
500 TABLET ORAL EVERY 8 HOURS SCHEDULED
Qty: 30 TABLET | Refills: 0 | Status: SHIPPED | OUTPATIENT
Start: 2024-07-01 | End: 2024-07-11

## 2024-07-01 NOTE — PROGRESS NOTES
[facility]        NAME: Caitlin Jarrett is a 54 y.o. female  : 1970    MRN: [id number]  DATE: 2024  TIME: 1:33 PM    Assessment and Plan   Dental abscess [K04.7]  1. Dental abscess              Patient Instructions     Patient Instructions   Take recently prescribed antibiotic from PCP as instructed.  Follow-up with oral surgeon.  All patient's questions answered and is agreeable with this plan.      Follow up with PCP in 3-5 days.  Proceed to  ER if symptoms worsen.    Chief Complaint     Chief Complaint   Patient presents with    Dental Pain     Abscess on left top of mouth          History of Present Illness       Patient is a 54-year-old female presenting today with dental abscess x 1 to 2 weeks.  Patient notes over the last couple weeks she has had worsening pain or discomfort around a broken tooth of her left upper side, has been in contact with dentist, PCP and has referral for an oral surgeon.  Notes that she was seeking getting an antibiotic, states that while in office here today she received a message from her PCP that an antibiotic was placed for the dental abscess but still wanted to be evaluated.  States she feels like she has been running some low-grade temperatures, currently afebrile at 97.9 °F.  Denies throat swelling, trouble swallowing, drooling, discharge or drainage.        Review of Systems   Review of Systems   Constitutional:  Negative for chills and fever.   HENT:  Positive for dental problem. Negative for ear pain and sore throat.    Eyes:  Negative for pain and visual disturbance.   Respiratory:  Negative for cough and shortness of breath.    Cardiovascular:  Negative for chest pain and palpitations.   Gastrointestinal:  Negative for abdominal pain and vomiting.   Genitourinary:  Negative for dysuria and hematuria.   Musculoskeletal:  Negative for arthralgias and back pain.   Skin:  Negative for color change and rash.   Neurological:  Negative for seizures and  syncope.   All other systems reviewed and are negative.        Current Medications       Current Outpatient Medications:     benzocaine (ORAJEL) 10 % mucosal gel, Apply 1 Application to the mouth or throat 3 (three) times a day as needed for mucositis, Disp: 5.3 g, Rfl: 0    irbesartan (AVAPRO) 150 mg tablet, Take 1 tablet (150 mg total) by mouth daily at bedtime, Disp: 90 tablet, Rfl: 1    penicillin V potassium (VEETID) 500 mg tablet, Take 1 tablet (500 mg total) by mouth every 8 (eight) hours for 10 days, Disp: 30 tablet, Rfl: 0    sertraline (ZOLOFT) 50 mg tablet, Take 1 tablet (50 mg total) by mouth daily, Disp: 90 tablet, Rfl: 3    Current Allergies     Allergies as of 2024    (No Known Allergies)            The following portions of the patient's history were reviewed and updated as appropriate: allergies, current medications, past family history, past medical history, past social history, past surgical history and problem list.     No past medical history on file.    Past Surgical History:   Procedure Laterality Date     SECTION      1992 and        Family History   Problem Relation Age of Onset    Hypertension Mother     Depression Mother     Breast cancer Mother     Uterine cancer Mother     Hyperlipidemia Father     Hypertension Father     Kidney disease Father     Heart attack Father     Stroke Father     Heart disease Father          Medications have been verified.        Objective   BP (!) 173/83   Pulse 82   Temp 97.9 °F (36.6 °C)   Resp 18   SpO2 99%        Physical Exam     Physical Exam  Vitals reviewed.   Constitutional:       General: She is not in acute distress.     Appearance: Normal appearance.   HENT:      Head: Normocephalic.      Right Ear: Tympanic membrane, ear canal and external ear normal.      Left Ear: Tympanic membrane, ear canal and external ear normal.      Nose: Nose normal.      Mouth/Throat:      Mouth: Mucous membranes are moist.        Comments:  Overall poor gross dentition, several missing teeth and caries, no swelling of floor of mouth, airway patent and clear.  Cardiovascular:      Rate and Rhythm: Normal rate.      Pulses: Normal pulses.   Pulmonary:      Effort: Pulmonary effort is normal.   Musculoskeletal:      Cervical back: Normal range of motion and neck supple. No tenderness.   Skin:     General: Skin is warm.      Capillary Refill: Capillary refill takes less than 2 seconds.   Neurological:      Mental Status: She is alert.

## 2024-07-01 NOTE — PATIENT INSTRUCTIONS
Take recently prescribed antibiotic from PCP as instructed.  Follow-up with oral surgeon.  All patient's questions answered and is agreeable with this plan.

## 2024-07-01 NOTE — TELEPHONE ENCOUNTER
Patient called, she feels her tooth abscess on her Upper left is back. She is out of antibiotic and has left a few messages for [facility] and no one has called her. She stated she has a fever and her mouth is swollen again. Would like to know if we could prescribe her another round of medication or if she needs to have another office visit?      Pharmacy on file is best, patient would like a call back.     Please advise, thank you.

## 2024-07-03 DIAGNOSIS — I10 PRIMARY HYPERTENSION: ICD-10-CM

## 2024-07-03 RX ORDER — IRBESARTAN 150 MG/1
150 TABLET ORAL
Qty: 100 TABLET | Refills: 1 | Status: SHIPPED | OUTPATIENT
Start: 2024-07-03

## 2024-07-12 ENCOUNTER — APPOINTMENT (OUTPATIENT)
Dept: RADIOLOGY | Facility: CLINIC | Age: 54
End: 2024-07-12
Payer: OTHER MISCELLANEOUS

## 2024-07-12 ENCOUNTER — OCCMED (OUTPATIENT)
Dept: URGENT CARE | Facility: CLINIC | Age: 54
End: 2024-07-12
Payer: OTHER MISCELLANEOUS

## 2024-07-12 DIAGNOSIS — S67.02XA CRUSHING INJURY OF LEFT THUMB, INITIAL ENCOUNTER: Primary | ICD-10-CM

## 2024-07-12 DIAGNOSIS — S67.02XA CRUSHING INJURY OF LEFT THUMB, INITIAL ENCOUNTER: ICD-10-CM

## 2024-07-12 PROCEDURE — 99283 EMERGENCY DEPT VISIT LOW MDM: CPT | Performed by: PHYSICAL MEDICINE & REHABILITATION

## 2024-07-12 PROCEDURE — 73140 X-RAY EXAM OF FINGER(S): CPT

## 2024-07-12 PROCEDURE — G0382 LEV 3 HOSP TYPE B ED VISIT: HCPCS | Performed by: PHYSICAL MEDICINE & REHABILITATION

## 2024-08-12 ENCOUNTER — OFFICE VISIT (OUTPATIENT)
Dept: URGENT CARE | Facility: CLINIC | Age: 54
End: 2024-08-12
Payer: COMMERCIAL

## 2024-08-12 ENCOUNTER — APPOINTMENT (OUTPATIENT)
Dept: RADIOLOGY | Facility: CLINIC | Age: 54
End: 2024-08-12
Payer: COMMERCIAL

## 2024-08-12 VITALS
TEMPERATURE: 97.9 F | OXYGEN SATURATION: 97 % | DIASTOLIC BLOOD PRESSURE: 82 MMHG | HEART RATE: 69 BPM | RESPIRATION RATE: 20 BRPM | SYSTOLIC BLOOD PRESSURE: 163 MMHG

## 2024-08-12 DIAGNOSIS — S60.011A CONTUSION OF RIGHT THUMB WITHOUT DAMAGE TO NAIL, INITIAL ENCOUNTER: ICD-10-CM

## 2024-08-12 DIAGNOSIS — S40.011A CONTUSION OF RIGHT SCAPULA, INITIAL ENCOUNTER: ICD-10-CM
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
## 2024-08-12 DIAGNOSIS — S62.521A CLOSED DISPLACED FRACTURE OF DISTAL PHALANX OF RIGHT THUMB, INITIAL ENCOUNTER: Primary | ICD-10-CM

## 2024-08-12 PROCEDURE — 99213 OFFICE O/P EST LOW 20 MIN: CPT | Performed by: NURSE PRACTITIONER

## 2024-08-12 PROCEDURE — 73010 X-RAY EXAM OF SHOULDER BLADE: CPT

## 2024-08-12 PROCEDURE — 73140 X-RAY EXAM OF FINGER(S): CPT

## 2024-08-12 NOTE — PATIENT INSTRUCTIONS
Your xray was preliminarily read by your provider.  A radiologist will read the xray and you will be notified if it is abnormal.    You are to Rest, Ice, compression (ace wrap, splint) elevate  Do not remove the splint until you are instructed to do so.   Take tylenol or motrin as needed.  You are to see your PCP   Go to the ED if symptoms worsen.    If tests have been performed at [facility], our office will contact you with results if changes need to be made to the care plan discussed with you at the visit.  You can review your full results on [facility] MyChart.    You have been given a referral for ortho hand - you will need to follow up with them

## 2024-08-12 NOTE — RESULT ENCOUNTER NOTE
IMPRESSION:  right thumb  First distal phalanx fracture as described.    Placed in thumb spica o prene splint and referred to ortho hand

## 2024-08-12 NOTE — LETTER
[facility]  [address] [address]      August 15, 2024    MRN: [id number]     Phone: [phone]     Dear Ms. Jarrett,    You recently had a(n) Diagnostic Imaging performed on 8/12/2024 at  [facility] that was requested by ZAHRAA Diaz. The study was reviewed by a radiologist, which is a physician who specializes in medical imaging. The radiologist issued a report describing his or her findings. In that report there was a finding that the radiologist felt warranted further discussion with your health care provider and that discussion would be beneficial to you.      The results were sent to ZAHRAA Diaz on 08/12/2024 11:02 AM. We recommend that you contact ZAHRAA Diaz at [phone] or set up an appointment to discuss the results of the imaging test. If you have already heard from ZAHRAA Diaz regarding the results of your study, you can disregard this letter.     This letter is not meant to alarm you, but intended to encourage you to follow-up on your results with the provider that sent you for the imaging study. In addition, we have enclosed answers to frequently asked questions by other patients who have also received a letter to review results with their health care provider (see page two).      Thank you for choosing [facility] for your medical imaging needs.                                                                                                                                                        FREQUENTLY ASKED QUESTIONS    Why am I receiving this letter?  [place] State Law requires us to notify patients who have findings on imaging exams that may require more testing or follow-up with a health professional within the next 3 months.        How serious is the finding on the imaging test?  This letter is sent to all patients who may need  follow-up or more testing within the next 3 months.  Receiving this letter does not necessarily mean you have a life-threatening imaging finding or disease.  Recommendations in the radiologist’s imaging report are general in nature and it is up to your healthcare provider to say whether those recommendations make sense for your situation.  You are strongly encouraged to talk to your health care provider about the results and ask whether additional steps need to be taken.    Where can I get a copy of the final report for my recent radiology exam?  To get a full copy of the report you can access your records online at [email or web address] or please contact [facility]’s Medical Records Department at [phone] Monday through Friday between 8 am and 6 pm.         What do I need to do now?           Please contact your health care provider who requested the imaging study to discuss what further actions (if any) are needed.  You may have already heard from (your ordering provider) in regard to this test in which case you can disregard this letter.        NOTICE IN ACCORDANCE WITH THE [place] STATE “PATIENT TEST RESULT INFORMATION ACT OF 2018”    You are receiving this notice as a result of a determination by your diagnostic imaging service that further discussions of your test results are warranted and would be beneficial to you.    The complete results of your test or tests have been or will be sent to the health care practitioner that ordered the test or tests. It is recommended that you contact your health care practitioner to discuss your results as soon as possible.

## 2024-08-12 NOTE — PROGRESS NOTES
[facility]        NAME: Caitlin Jarrett is a 54 y.o. female  : 1970    MRN: [id number]  DATE: 2024  TIME: 9:00 AM    Assessment and Plan   Closed displaced fracture of distal phalanx of right thumb, initial encounter [S62.521A]  1. Closed displaced fracture of distal phalanx of right thumb, initial encounter  Ambulatory referral to Orthopedic Surgery      2. Contusion of right thumb without damage to nail, initial encounter  XR thumb right first digit-min 2v    Ambulatory referral to Orthopedic Surgery      3. Contusion of right scapula, initial encounter  XR scapula right      4. Fall, initial encounter  XR scapula right    XR thumb right first digit-min 2v    Ambulatory referral to Orthopedic Surgery            Patient Instructions       Follow up with PCP in 3-5 days.  Proceed to  ER if symptoms worsen.    If tests have been performed at [facility], our office will contact you with results if changes need to be made to the care plan discussed with you at the visit.  You can review your full results on [facility] MyChart.    Your xray was preliminarily read by your provider.  A radiologist will read the xray and you will be notified if it is abnormal.    You are to Rest, Ice, compression (ace wrap, splint) elevate  Do not remove the splint until you are instructed to do so.   Take tylenol or motrin as needed.  You are to see your PCP   Go to the ED if symptoms worsen.    Chief Complaint     Chief Complaint   Patient presents with    Thumb Injury         History of Present Illness       This is a 54 year old female who states was playing pickleball yesterday and fell into a metal wall and hit her right scapula.  As she was falling she had the paddle in her right hand and injured her right thumb.  She states the scapula is painful with all movement.  Thumb is swollen and unable to touch pinky with thumb.   She applied ice to thumb and took advil.  PMH Is listed.         Review of Systems    Review of Systems   Constitutional: Negative.    HENT: Negative.     Eyes: Negative.    Respiratory: Negative.     Cardiovascular: Negative.    Gastrointestinal: Negative.    Endocrine: Negative.    Genitourinary: Negative.    Musculoskeletal:         Right scapula and thumb pain    Skin:  Positive for color change.   Allergic/Immunologic: Negative.    Neurological: Negative.    Hematological: Negative.    Psychiatric/Behavioral: Negative.           Current Medications       Current Outpatient Medications:     irbesartan (AVAPRO) 150 mg tablet, TAKE 1 TABLET BY MOUTH AT BEDTIME, Disp: 100 tablet, Rfl: 1    sertraline (ZOLOFT) 50 mg tablet, Take 1 tablet (50 mg total) by mouth daily, Disp: 90 tablet, Rfl: 3    benzocaine (ORAJEL) 10 % mucosal gel, Apply 1 Application to the mouth or throat 3 (three) times a day as needed for mucositis (Patient not taking: Reported on 2024), Disp: 5.3 g, Rfl: 0    Current Allergies     Allergies as of 2024    (No Known Allergies)            The following portions of the patient's history were reviewed and updated as appropriate: allergies, current medications, past family history, past medical history, past social history, past surgical history and problem list.     History reviewed. No pertinent past medical history.    Past Surgical History:   Procedure Laterality Date     SECTION      1992 and        Family History   Problem Relation Age of Onset    Hypertension Mother     Depression Mother     Breast cancer Mother     Uterine cancer Mother     Hyperlipidemia Father     Hypertension Father     Kidney disease Father     Heart attack Father     Stroke Father     Heart disease Father          Medications have been verified.        Objective   /82   Pulse 69   Temp 97.9 °F (36.6 °C)   Resp 20   SpO2 97%   No LMP recorded. Patient is postmenopausal.       Physical Exam     Physical Exam  Vitals and nursing note reviewed.   Constitutional:        "General: She is not in acute distress.     Appearance: Normal appearance. She is normal weight. She is not ill-appearing, toxic-appearing or diaphoretic.   HENT:      Head: Normocephalic and atraumatic.   Eyes:      Extraocular Movements: Extraocular movements intact.   Cardiovascular:      Rate and Rhythm: Normal rate.      Pulses: Normal pulses.   Pulmonary:      Effort: Pulmonary effort is normal.   Musculoskeletal:         General: Swelling, tenderness and signs of injury present. No deformity.      Cervical back: Normal range of motion and neck supple.      Comments: Right thumb is swollen, ecchymotic, TTP.  No deformity  NVI. Unable to touch thumb to little finger.  + radial pulse.    Skin:     General: Skin is warm and dry.      Capillary Refill: Capillary refill takes less than 2 seconds.      Findings: Bruising present.          Neurological:      General: No focal deficit present.      Mental Status: She is alert and oriented to person, place, and time.   Psychiatric:         Mood and Affect: Mood normal.         Behavior: Behavior normal.         Thought Content: Thought content normal.         Judgment: Judgment normal.       Preliminary reading scapula xray  Negative for acute process   Waiting on rad read      Preliminary reading thumb xray  Fracture distal phalanx  Waiting on final read       Orthopedic injury treatment    Date/Time: 8/12/2024 8:52 AM    Performed by: ZAHRAA Diaz  Authorized by: ZAHRAA Diaz    Patient Location:  [facility] Protocol:  Consent: The procedure was performed in an emergent situation. Verbal consent obtained. Written consent obtained.  Risks and benefits: risks, benefits and alternatives were discussed  Consent given by: patient  Time out: Immediately prior to procedure a \"time out\" was called to verify the correct patient, procedure, equipment, support staff and site/side marked as required.  Timeout called at: 8/12/2024 8:52 " CADEN.  Patient understanding: patient states understanding of the procedure being performed  Patient consent: the patient's understanding of the procedure matches consent given  Procedure consent: procedure consent matches procedure scheduled  Relevant documents: relevant documents present and verified  Test results: test results available and properly labeled  Site marked: the operative site was marked  Radiology Images displayed and confirmed. If images not available, report reviewed: imaging studies available  Required items: required blood products, implants, devices, and special equipment available  Patient identity confirmed: verbally with patient and hospital-assigned identification number    Injury location:  Finger  Location details:  Right thumb  Injury type:  Fracture  Fracture type: distal phalanx    Neurovascular status: Neurovascularly intact    Distal perfusion: normal    Neurological function: normal    Range of motion: reduced    Manipulation performed?: No    Immobilization:  Other (comment) (velcro thumb o prene spline/dynamic)  Neurovascular status: Neurovascularly intact    Distal perfusion: normal    Neurological function: normal    Range of motion: unchanged    Patient tolerance:  Patient tolerated the procedure well with no immediate complications

## 2024-08-13 ENCOUNTER — TELEPHONE (OUTPATIENT)
Age: 54
End: 2024-08-13

## 2024-08-13 NOTE — TELEPHONE ENCOUNTER
Patient was at urgent care yesterday for a broken right thumb.  She is taking ibuprofen and tylenol and icing it but is still in a lot of pain at night and during he day.  She is asking if a pain medicine can be called in to the pharmacy for her to [facility], [address] [place].  She would like a call back letting her know if a script will be written or if she needs to be seen that is ok too.

## 2024-08-14 ENCOUNTER — OFFICE VISIT (OUTPATIENT)
Dept: INTERNAL MEDICINE CLINIC | Facility: CLINIC | Age: 54
End: 2024-08-14
Payer: COMMERCIAL

## 2024-08-14 VITALS
BODY MASS INDEX: 28.66 KG/M2 | SYSTOLIC BLOOD PRESSURE: 140 MMHG | OXYGEN SATURATION: 98 % | HEART RATE: 78 BPM | WEIGHT: 172 LBS | HEIGHT: 65 IN | TEMPERATURE: 97.3 F | DIASTOLIC BLOOD PRESSURE: 76 MMHG

## 2024-08-14 DIAGNOSIS — S62.521D CLOSED DISPLACED FRACTURE OF DISTAL PHALANX OF RIGHT THUMB WITH ROUTINE HEALING: Primary | ICD-10-CM

## 2024-08-14 PROCEDURE — 99214 OFFICE O/P EST MOD 30 MIN: CPT | Performed by: NURSE PRACTITIONER

## 2024-08-14 RX ORDER — TRAMADOL HYDROCHLORIDE 50 MG/1
50 TABLET ORAL EVERY 6 HOURS
Qty: 90 TABLET | Refills: 0 | Status: SHIPPED | OUTPATIENT
Start: 2024-08-14

## 2024-08-14 NOTE — ASSESSMENT & PLAN NOTE
8/11/2024 to :  This is a 54 year old female who states was playing pickleball yesterday and fell into a metal wall and hit her right scapula. As she was falling she had the paddle in her right hand and injured her right thumb. She states the scapula is painful with all movement. Thumb is swollen and unable to touch pinky with thumb. She applied ice to thumb and took advil. PMH Is listed.     8/12/2024 xray   Nondisplaced intra-articular fracture at the volar base of the first distal phalanx.     8/14/2024  Most of the healing happens between 3 to 6 weeks after your thumb fracture. It's normal to have aches and discomfort beyond this. This usually happens when you try activities you haven't done for a while. It's also normal for the area to be more sensitive for a while after the injury.   It is normal for the hand to be painful and swollen following this injury, sometimes even spreading to parts of the hand that were not injured. However, although a normal reaction, swelling which stays in the fingers and thumb too long, becomes very dense and sticky (fibrous) and can result in long term stiffness.   Will start her on Tramadol and monitor her   Okay to take one pill every 6 hours  If your pain is not decreased by a little bit - in about an hour or two, then take either a half of a pill or a whole pill   If you need to take two pills every 4-6 hours, just let me know, so that I can change the prescription to help you better.

## 2024-08-14 NOTE — PROGRESS NOTES
Ambulatory Visit  Name: Caitlin Jarrett      : 1970      MRN: [id number]  Encounter Provider: ZAHRAA Knowles  Encounter Date: 2024   Encounter department: [facility]    Assessment & Plan   1. Closed displaced fracture of distal phalanx of right thumb with routine healing  Assessment & Plan:  2024 to UC:  This is a 54 year old female who states was playing pickleball yesterday and fell into a metal wall and hit her right scapula. As she was falling she had the paddle in her right hand and injured her right thumb. She states the scapula is painful with all movement. Thumb is swollen and unable to touch pinky with thumb. She applied ice to thumb and took advil. PMH Is listed.     2024 xray   Nondisplaced intra-articular fracture at the volar base of the first distal phalanx.     2024  Most of the healing happens between 3 to 6 weeks after your thumb fracture. It's normal to have aches and discomfort beyond this. This usually happens when you try activities you haven't done for a while. It's also normal for the area to be more sensitive for a while after the injury.   It is normal for the hand to be painful and swollen following this injury, sometimes even spreading to parts of the hand that were not injured. However, although a normal reaction, swelling which stays in the fingers and thumb too long, becomes very dense and sticky (fibrous) and can result in long term stiffness.   Will start her on Tramadol and monitor her   Okay to take one pill every 6 hours  If your pain is not decreased by a little bit - in about an hour or two, then take either a half of a pill or a whole pill   If you need to take two pills every 4-6 hours, just let me know, so that I can change the prescription to help you better.   Orders:  -     traMADol (Ultram) 50 mg tablet; Take 1 tablet (50 mg total) by mouth every 6 (six) hours       History of Present Illness     The patient is here  "today to discuss her right thumb pain. Please continue to the PORCH section of the note for details of today's visit.          Review of Systems   Musculoskeletal:  Positive for arthralgias, joint swelling and myalgias.     Current Outpatient Medications on File Prior to Visit   Medication Sig Dispense Refill   • irbesartan (AVAPRO) 150 mg tablet TAKE 1 TABLET BY MOUTH AT BEDTIME 100 tablet 1   • sertraline (ZOLOFT) 50 mg tablet Take 1 tablet (50 mg total) by mouth daily 90 tablet 3     No current facility-administered medications on file prior to visit.      Objective     /76   Pulse 78   Temp (!) 97.3 °F (36.3 °C)   Ht 5' 5\" (1.651 m)   Wt 78 kg (172 lb)   SpO2 98%   BMI 28.62 kg/m²     Physical Exam  Musculoskeletal:        Hands:        Administrative Statements   I have spent a total time of 30 minutes in caring for this patient on the day of the visit/encounter including Diagnostic results, Prognosis, Risks and benefits of tx options, Instructions for management, Patient and family education, Importance of tx compliance, Risk factor reductions, Impressions, Counseling / Coordination of care, Documenting in the medical record, Reviewing / ordering tests, medicine, procedures  , and Obtaining or reviewing history  .  "

## 2024-08-15 ENCOUNTER — TELEPHONE (OUTPATIENT)
Age: 54
End: 2024-08-15

## 2024-08-15 NOTE — TELEPHONE ENCOUNTER
Patient called, she was seen 8/14 for an office visit. She was prescribed Tramadol, but when she went to the pharmacy to pick it up. The pharmacy told the patient her insurance requires a prior auth. The patient stated she is just going to pay for the medication out of pocket since it's $20.00. She stated she didn't want anyone to do extra work.     SHARYN

## 2024-08-22 ENCOUNTER — APPOINTMENT (OUTPATIENT)
Dept: RADIOLOGY | Facility: CLINIC | Age: 54
End: 2024-08-22
Payer: COMMERCIAL

## 2024-08-22 ENCOUNTER — OFFICE VISIT (OUTPATIENT)
Dept: OBGYN CLINIC | Facility: CLINIC | Age: 54
End: 2024-08-22
Payer: COMMERCIAL

## 2024-08-22 VITALS
WEIGHT: 172 LBS | DIASTOLIC BLOOD PRESSURE: 81 MMHG | TEMPERATURE: 98.5 F | BODY MASS INDEX: 28.66 KG/M2 | SYSTOLIC BLOOD PRESSURE: 126 MMHG | HEART RATE: 80 BPM | HEIGHT: 65 IN

## 2024-08-22 DIAGNOSIS — S60.011A CONTUSION OF RIGHT THUMB WITHOUT DAMAGE TO NAIL, INITIAL ENCOUNTER: ICD-10-CM

## 2024-08-22 DIAGNOSIS — S62.521A CLOSED DISPLACED FRACTURE OF DISTAL PHALANX OF RIGHT THUMB, INITIAL ENCOUNTER: Primary | ICD-10-CM
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
## 2024-08-22 DIAGNOSIS — S62.521A CLOSED DISPLACED FRACTURE OF DISTAL PHALANX OF RIGHT THUMB, INITIAL ENCOUNTER: ICD-10-CM

## 2024-08-22 PROCEDURE — 99204 OFFICE O/P NEW MOD 45 MIN: CPT | Performed by: FAMILY MEDICINE

## 2024-08-22 PROCEDURE — 73140 X-RAY EXAM OF FINGER(S): CPT

## 2024-08-22 NOTE — PROGRESS NOTES
[facility] [address] [address] [phone]      Assessment:  1. Closed displaced fracture of distal phalanx of right thumb, initial encounter  -     Ambulatory referral to Orthopedic Surgery  -     XR thumb right first digit-min 2v; Future; Expected date: 08/22/2024  2. Contusion of right thumb without damage to nail, initial encounter  -     Ambulatory referral to Orthopedic Surgery  3. Fall, initial encounter  -     Ambulatory referral to Orthopedic Surgery      Plan:  Patient Instructions   F/u 2 wks  XR R thumb 2 wks  Finger splinted  Icing/elevation/OTC pain meds   Return in about 2 weeks (around 9/5/2024) for Recheck.    Chief Complaint:  Chief Complaint   Patient presents with   • Right Thumb - Pain       Subjective:   HPI    Patient ID: Caitlin Jarrett is a 54 y.o. female     Here c/o R thumb fx  She was playing pickleball and fell and hit the paddle on her R hand  Seen in .  XR done. Given splint. Note reviewed.  Still having pain  Can't bend finger  Wearing splint  Taking tylenol/motrin/tramadol- PRN- not helping  Icing    Narrative & Impression   XR THUMB RIGHT FIRST DIGIT-MIN 2V     INDICATION: S60.011A: Contusion of right thumb without damage to nail, initial encounter  W19.XXXA: Unspecified fall, initial encounter.     COMPARISON: None     For the purposes of institution wide universal language the following terms will apply: (thumb=1st digit/finger, index finger=2nd digit/finger, long finger=3rd digit/finger, ring=4th digit/finger and small finger=5th digit/finger)     FINDINGS:     Nondisplaced intra-articular fracture at the volar base of the first distal phalanx.     No significant degenerative changes.     No lytic or blastic osseous lesion.     Unremarkable soft tissues.     IMPRESSION:  First distal phalanx fracture as described.       Review of Systems   Constitutional:  Negative for fatigue and fever.  "  Respiratory:  Negative for shortness of breath.    Cardiovascular:  Negative for chest pain.   Gastrointestinal:  Negative for abdominal pain and nausea.   Genitourinary:  Negative for dysuria.   Musculoskeletal:  Positive for arthralgias.   Skin:  Negative for rash and wound.   Neurological:  Negative for weakness and headaches.       Objective:  Vitals:  /81 (BP Location: Left arm, Patient Position: Sitting, Cuff Size: Large)   Pulse 80   Temp 98.5 °F (36.9 °C) (Tympanic)   Ht 5' 5\" (1.651 m)   Wt 78 kg (172 lb)   BMI 28.62 kg/m²     The following portions of the patient's history were reviewed and updated as appropriate: allergies, current medications, past family history, past medical history, past social history, past surgical history, and problem list.    Physical exam:  Physical Exam  Constitutional:       Appearance: Normal appearance. She is normal weight.   Eyes:      Extraocular Movements: Extraocular movements intact.   Pulmonary:      Effort: Pulmonary effort is normal.   Musculoskeletal:         General: Tenderness present.      Cervical back: Normal range of motion.      Comments: R thumb- TTP  NVI   Skin:     General: Skin is warm and dry.   Neurological:      General: No focal deficit present.      Mental Status: She is alert and oriented to person, place, and time. Mental status is at baseline.   Psychiatric:         Mood and Affect: Mood normal.         Behavior: Behavior normal.         Thought Content: Thought content normal.         Judgment: Judgment normal.     Ortho Exam    I have personally reviewed pertinent films in PACS and my interpretation is XR  R hand- nondisplaced intra articular fx/ volar base..  Splint application    Date/Time: 8/22/2024 3:00 PM    Performed by: Catracho Lofton MD  Authorized by: Catracho Lofton MD  Universal Protocol:  procedure performed by consultantConsent: Verbal consent obtained.  Risks and benefits: risks, benefits and alternatives were " discussed  Consent given by: patient  Timeout called at: 8/22/2024 3:10 PM.    Pre-procedure details:     Sensation:  Normal  Procedure details:     Laterality:  Right    Splint type:  Finger splint, static        Catracho Lofton MD

## 2024-10-14 PROBLEM — Z13.220 SCREENING FOR HYPERLIPIDEMIA: Status: ACTIVE | Noted: 2024-10-14

## 2024-10-14 PROBLEM — Z12.12 SCREENING FOR COLORECTAL CANCER: Status: ACTIVE | Noted: 2024-10-14

## 2024-10-14 PROBLEM — E55.9 VITAMIN D DEFICIENCY: Status: ACTIVE | Noted: 2024-10-14

## 2024-10-14 PROBLEM — Z12.4 SCREENING FOR CERVICAL CANCER: Status: ACTIVE | Noted: 2024-10-14

## 2024-10-14 PROBLEM — Z12.31 BREAST CANCER SCREENING BY MAMMOGRAM: Chronic | Status: ACTIVE | Noted: 2024-10-14

## 2024-10-14 PROBLEM — F33.1 MODERATE EPISODE OF RECURRENT MAJOR DEPRESSIVE DISORDER (HCC): Chronic | Status: ACTIVE | Noted: 2023-01-17

## 2024-10-14 PROBLEM — I73.00 RAYNAUD'S PHENOMENON WITHOUT GANGRENE: Chronic | Status: ACTIVE | Noted: 2022-12-12

## 2024-10-14 PROBLEM — E55.9 VITAMIN D DEFICIENCY: Chronic | Status: ACTIVE | Noted: 2024-10-14

## 2024-10-14 PROBLEM — Z12.31 BREAST CANCER SCREENING BY MAMMOGRAM: Status: ACTIVE | Noted: 2024-10-14

## 2024-10-14 PROBLEM — Z12.4 SCREENING FOR CERVICAL CANCER: Chronic | Status: ACTIVE | Noted: 2024-10-14

## 2024-10-14 PROBLEM — Z12.11 COLON CANCER SCREENING: Chronic | Status: ACTIVE | Noted: 2024-10-14

## 2024-10-14 PROBLEM — Z13.1 SCREENING FOR DIABETES MELLITUS: Status: ACTIVE | Noted: 2024-10-14

## 2024-10-14 PROBLEM — Z12.2 SCREENING FOR LUNG CANCER: Chronic | Status: ACTIVE | Noted: 2024-10-14

## 2024-10-14 PROBLEM — F17.210 SMOKING GREATER THAN 20 PACK YEARS: Chronic | Status: ACTIVE | Noted: 2023-01-17

## 2024-10-14 PROBLEM — Z12.11 SCREENING FOR COLORECTAL CANCER: Status: ACTIVE | Noted: 2024-10-14

## 2024-10-14 PROBLEM — Z23 ENCOUNTER FOR IMMUNIZATION: Status: ACTIVE | Noted: 2024-10-14

## 2024-10-14 PROBLEM — Z13.1 SCREENING FOR DIABETES MELLITUS: Chronic | Status: ACTIVE | Noted: 2024-10-14

## 2024-10-14 PROBLEM — Z12.31 ENCOUNTER FOR SCREENING MAMMOGRAM FOR MALIGNANT NEOPLASM OF BREAST: Status: ACTIVE | Noted: 2024-10-14

## 2024-10-14 PROBLEM — Z00.00 ANNUAL PHYSICAL EXAM: Status: ACTIVE | Noted: 2024-10-14

## 2024-10-14 PROBLEM — I10 PRIMARY HYPERTENSION: Chronic | Status: ACTIVE | Noted: 2023-01-17

## 2024-10-14 PROBLEM — Z00.00 ANNUAL PHYSICAL EXAM: Chronic | Status: ACTIVE | Noted: 2024-10-14

## 2024-10-14 PROBLEM — Z13.220 SCREENING FOR HYPERLIPIDEMIA: Chronic | Status: ACTIVE | Noted: 2024-10-14

## 2024-10-14 PROBLEM — Z12.2 SCREENING FOR LUNG CANCER: Status: ACTIVE | Noted: 2024-10-14

## 2024-10-14 PROBLEM — Z12.11 COLON CANCER SCREENING: Status: ACTIVE | Noted: 2024-10-14

## 2024-10-14 NOTE — ASSESSMENT & PLAN NOTE
Following with orthopedics  Will refer to Dr. Schaefer as the patient was not happy with her last orthopedic  Will order her an MRI of her right thumb   CMP is ordered     Orders:    Ambulatory Referral to Orthopedic Surgery; Future    MRI thumb right wo and w contrast; Future    Comprehensive metabolic panel; Future    traMADol (Ultram) 50 mg tablet; Take 2 tablets (100 mg total) by mouth every 6 (six) hours as needed for moderate pain

## 2024-10-14 NOTE — ASSESSMENT & PLAN NOTE
This is a chronic and stable disease process  Continue  Sertraline          Products Recommended: SPF 30 or higher Detail Level: Detailed General Sunscreen Counseling: I recommended a broad spectrum sunscreen with a SPF of 30 or higher.  I explained that SPF 30 sunscreens block approximately 97 percent of the sun's harmful rays.  Sunscreens should be applied at least 15 minutes prior to expected sun exposure and then every 2 hours after that as long as sun exposure continues. If swimming or exercising sunscreen should be reapplied every 45 minutes to an hour after getting wet or sweating.  One ounce, or the equivalent of a shot glass full of sunscreen, is adequate to protect the skin not covered by a bathing suit. I also recommended a lip balm with a sunscreen as well. Sun protective clothing can be used in lieu of sunscreen but must be worn the entire time you are exposed to the sun's rays.

## 2024-10-14 NOTE — ASSESSMENT & PLAN NOTE
I discussed with her that she is a candidate for lung cancer CT screening.     The following Shared Decision-Making points were covered:  Benefits of screening were discussed, including the rates of reduction in death from lung cancer and other causes.  Harms of screening were reviewed, including false positive tests, radiation exposure levels, risks of invasive procedures, risks of complications of screening, and risk of overdiagnosis.  I counseled on the importance of adherence to annual lung cancer LDCT screening, impact of co-morbidities, and ability or willingness to undergo diagnosis and treatment.  I counseled on the importance of maintaining abstinence as a former smoker or was counseled on the importance of smoking cessation if a current smoker    Review of Eligibility Criteria: She meets all of the criteria for Lung Cancer Screening.   She is 54 y.o.   She has 20 pack year tobacco history and is a current smoker or has quit within the past 15 years  She presents no signs or symptoms of lung cancer    After discussion, the patient decided to elect lung cancer screening.

## 2024-10-14 NOTE — ASSESSMENT & PLAN NOTE
This is a chronic and stable disease process  Continue  Tramadol    At this time, patient is not required to provide urine or saliva sample as this is a prn medication.    Patient agrees to provide an Oral or Urine Sample for Toxicology purposes at any time requested.    Scheduled Medication Review:   The Opioid and Controlled Substance Paperwork was filled out.    This patient was educated on side effects, risks of taking this type of medication which include abuse, misuse, dependence, addiction and tolerance.    The Risk Benefit Disclosure includes:  ??The increased risk of respratory depression and death with opioids and controlled substance use has been discussed along with the benefits of opioid and controlled substance use.   This is includes the marked increase in respiratory depression and death when used in combination with benzodlazepine medications, muscle relaxers, sedatives, MAOls, illicit drugs, and alcohol.  ???The risk of opioid and controlled substance addiction as well as the proper prescribed use of opioids and controlled substances been discussed.  This includes instruction to lock away and secure opioids and controlled substances from the reach of children and pets and not to share, sell, or distribute prescribed controlled substances to anyone else as such behavior will result in discontinuation of opiold therapy and controlled substances.  ??The patient has been advised that discontinuation of alcohol use is a requirement of initiation and continuation of opioid therapy and controlled substance therapy.  ???The risks of dizziness, drowsiness and motor impairment been discussed with the patient with respect to opioid and controlled substance use, and the patlent been advised not to drive or operate any heavy machinery or engage in any safety sensitive tasks while using opioids or controlled substances.  All questions were answered including different dosing levels, increasing and decreasing of  of this medication.  We discussed their continued open discussions with the PCP in order to make sure that they are on the correct dose.     We reviewed the potential side effects of the medications including, but are not limited to, constipation, diarrhea, nausea/upset stomach, drowsiness, fatigue, dizziness, urinary retention, visual changes, insomnia, headaches, nightmares, slowed breathing while sleeping, UTI issues, impaired judgment, addiction issues and the risk of fatal overdose if not taken as prescribed.   We discussed the importance of notifying the office/provider immediately of any side effects.   We discussed the importance of immediate notification if there are any feelings of suicidality thoughts or behaviors   We discussed the importance of contacting the office if he has any tachycardia or fainting situations.   We discussed that sharing medications is a felony.   We discussed other side effects such as QT prolongation, risks of Myocardial Infarction (heart attack), stroke and sudden death.   We discussed immediate notification if there is any seizure-like activity.    We discussed issues with angioedema as an anaphylactic reactions.      I have personally reviewed the [place] Drug Monitoring Program (PDMP) website prior to prescribing this medication and they have been found to be appropriate for this medication.  At this point in time, the patient is showing no signs of addiction, abuse, diversion or suicidal ideation.  The patient's use has been found to be appropriate without any concerns for misuse by the patient.   The patient's current conditions require continued scheduled medication use at this time.   Future review for continued appropriate medication use and misuse will continue.      ???The patient been advised to the rationale and requirement of using the PDMP, UDS, Pill Counts, and pain assessment tools to initiate and continue opioid and controlled substance therapy.  The  patient has also been advised as to the appropriate way to call in for refill of medication, allowing for a five to seven day time frame for medications refills.    The patient understands and agrees to use these medications only as prescribed. At this point in time, the patient is showing no signs of addiction, abuse, diversion or suicidal ideation.  All the patient's questions were answered to their satisfaction.    This patient has tried both non-drug (yoga, meditation) and drugs that do not contain opioids, barbiturates, benzodiazepines, and stimulants.   The requested drug will be used with other drugs and closely monitored.

## 2024-10-14 NOTE — PROGRESS NOTES
Ambulatory Visit  Name: Caitlin Jarrett      : 1970      MRN: [id number]  Encounter Provider: ZAHRAA Knowles  Encounter Date: 10/15/2024   Encounter department: [facility]    Assessment & Plan  Screening for cervical cancer    Orders:    Ambulatory referral to Obstetrics / Gynecology; Future    Screening for colorectal cancer    Orders:    Cologuard    Encounter for immunization    Orders:    influenza vaccine, recombinant, PF, 0.5 mL IM (Flublok)    Smoking greater than 20 pack years    Orders:    CT lung screening program; Future    Encounter for screening mammogram for malignant neoplasm of breast    Orders:    Mammo screening bilateral w 3d and cad; Future    Primary hypertension  This is a chronic and stable disease process.   We discussed diet and exercise  We discussed low-salt diet  Continue  Irbesartan         Closed displaced fracture of distal phalanx of right thumb with routine healing  Following with orthopedics  Will refer to Dr. Schaefer as the patient was not happy with her last orthopedic  Will order her an MRI of her right thumb   CMP is ordered     Orders:    Ambulatory Referral to Orthopedic Surgery; Future    MRI thumb right wo and w contrast; Future    Comprehensive metabolic panel; Future    traMADol (Ultram) 50 mg tablet; Take 2 tablets (100 mg total) by mouth every 6 (six) hours as needed for moderate pain    Moderate episode of recurrent major depressive disorder (HCC)  This is a chronic and stable disease process  Continue  Sertraline         Annual physical exam    Orders:    Comprehensive metabolic panel; Future    CBC and differential; Future    Raynaud's phenomenon without gangrene  This is a chronic and stable disease process  Continue  Tramadol    At this time, patient is not required to provide urine or saliva sample as this is a prn medication.    Patient agrees to provide an Oral or Urine Sample for Toxicology purposes at any time  requested.    Scheduled Medication Review:   The Opioid and Controlled Substance Paperwork was filled out.    This patient was educated on side effects, risks of taking this type of medication which include abuse, misuse, dependence, addiction and tolerance.    The Risk Benefit Disclosure includes:  ??The increased risk of respratory depression and death with opioids and controlled substance use has been discussed along with the benefits of opioid and controlled substance use.   This is includes the marked increase in respiratory depression and death when used in combination with benzodlazepine medications, muscle relaxers, sedatives, MAOls, illicit drugs, and alcohol.  ???The risk of opioid and controlled substance addiction as well as the proper prescribed use of opioids and controlled substances been discussed.  This includes instruction to lock away and secure opioids and controlled substances from the reach of children and pets and not to share, sell, or distribute prescribed controlled substances to anyone else as such behavior will result in discontinuation of opiold therapy and controlled substances.  ??The patient has been advised that discontinuation of alcohol use is a requirement of initiation and continuation of opioid therapy and controlled substance therapy.  ???The risks of dizziness, drowsiness and motor impairment been discussed with the patient with respect to opioid and controlled substance use, and the patlent been advised not to drive or operate any heavy machinery or engage in any safety sensitive tasks while using opioids or controlled substances.  All questions were answered including different dosing levels, increasing and decreasing of of this medication.  We discussed their continued open discussions with the PCP in order to make sure that they are on the correct dose.     We reviewed the potential side effects of the medications including, but are not limited to, constipation, diarrhea,  nausea/upset stomach, drowsiness, fatigue, dizziness, urinary retention, visual changes, insomnia, headaches, nightmares, slowed breathing while sleeping, UTI issues, impaired judgment, addiction issues and the risk of fatal overdose if not taken as prescribed.   We discussed the importance of notifying the office/provider immediately of any side effects.   We discussed the importance of immediate notification if there are any feelings of suicidality thoughts or behaviors   We discussed the importance of contacting the office if he has any tachycardia or fainting situations.   We discussed that sharing medications is a felony.   We discussed other side effects such as QT prolongation, risks of Myocardial Infarction (heart attack), stroke and sudden death.   We discussed immediate notification if there is any seizure-like activity.    We discussed issues with angioedema as an anaphylactic reactions.      I have personally reviewed the [place] Drug Monitoring Program (PDMP) website prior to prescribing this medication and they have been found to be appropriate for this medication.  At this point in time, the patient is showing no signs of addiction, abuse, diversion or suicidal ideation.  The patient's use has been found to be appropriate without any concerns for misuse by the patient.   The patient's current conditions require continued scheduled medication use at this time.   Future review for continued appropriate medication use and misuse will continue.      ???The patient been advised to the rationale and requirement of using the PDMP, UDS, Pill Counts, and pain assessment tools to initiate and continue opioid and controlled substance therapy.  The patient has also been advised as to the appropriate way to call in for refill of medication, allowing for a five to seven day time frame for medications refills.    The patient understands and agrees to use these medications only as prescribed. At this point in  time, the patient is showing no signs of addiction, abuse, diversion or suicidal ideation.  All the patient's questions were answered to their satisfaction.    This patient has tried both non-drug (yoga, meditation) and drugs that do not contain opioids, barbiturates, benzodiazepines, and stimulants.   The requested drug will be used with other drugs and closely monitored.         Vitamin D deficiency    Orders:    Vitamin D 25 hydroxy; Future    Screening for lung cancer  I discussed with her that she is a candidate for lung cancer CT screening.     The following Shared Decision-Making points were covered:  Benefits of screening were discussed, including the rates of reduction in death from lung cancer and other causes.  Harms of screening were reviewed, including false positive tests, radiation exposure levels, risks of invasive procedures, risks of complications of screening, and risk of overdiagnosis.  I counseled on the importance of adherence to annual lung cancer LDCT screening, impact of co-morbidities, and ability or willingness to undergo diagnosis and treatment.  I counseled on the importance of maintaining abstinence as a former smoker or was counseled on the importance of smoking cessation if a current smoker    Review of Eligibility Criteria: She meets all of the criteria for Lung Cancer Screening.   She is 54 y.o.   She has 20 pack year tobacco history and is a current smoker or has quit within the past 15 years  She presents no signs or symptoms of lung cancer    After discussion, the patient decided to elect lung cancer screening.         Screening for hyperlipidemia    Orders:    Lipid panel; Future    Screening for diabetes mellitus    Orders:    Hemoglobin A1C; Future    Colon cancer screening         Breast cancer screening by mammogram         Ingrown nail of great toe of right foot  Referral to podiatry   Soaking in epsom salt   Will start her on keflex and diflucan             Orders:     cephalexin (KEFLEX) 500 mg capsule; Take 1 capsule (500 mg total) by mouth 2 (two) times a day for 10 days    fluconazole (DIFLUCAN) 200 mg tablet; Take 1 tablet (200 mg total) by mouth daily for 10 days    Ambulatory Referral to Podiatry; Future       History of Present Illness     The patient is here today to discuss her medications and treatment plans.   I reviewed their medical conditions, medications, laboratory and radiology studies.  I reviewed their scheduled future lab studies with the patient.   The patient's current treatment plan is effective.   There is no change in the current therapy.   I ask the patient to continue their current therapy.   The patient voiced their understanding and agreement.   Follow up after 3/7/2025 for her annual physical.  Please continue to the PORCH section of the note for details of today's visit.               History obtained from : patient  Review of Systems   Constitutional:  Negative for activity change, chills, fatigue and fever.   HENT:  Negative for rhinorrhea and sore throat.    Eyes:  Negative for pain.   Respiratory:  Negative for cough and shortness of breath.    Cardiovascular:  Negative for chest pain, palpitations and leg swelling.   Gastrointestinal:  Negative for abdominal pain, constipation, diarrhea, nausea and vomiting.   Genitourinary:  Negative for difficulty urinating, flank pain, frequency and urgency.   Musculoskeletal:  Negative for gait problem, joint swelling and myalgias.   Skin:  Negative for color change.   Neurological:  Negative for dizziness, weakness, light-headedness and headaches.   Psychiatric/Behavioral:  Negative for sleep disturbance. The patient is not nervous/anxious.    All other systems reviewed and are negative.    Current Outpatient Medications on File Prior to Visit   Medication Sig Dispense Refill    irbesartan (AVAPRO) 150 mg tablet TAKE 1 TABLET BY MOUTH AT BEDTIME 100 tablet 1    sertraline (ZOLOFT) 50 mg tablet Take 1  "tablet (50 mg total) by mouth daily 90 tablet 3     No current facility-administered medications on file prior to visit.          Objective     BP (!) 160/104   Pulse 92   Temp 97.6 °F (36.4 °C) (Tympanic)   Ht 5' 5\" (1.651 m)   Wt 76.7 kg (169 lb)   SpO2 96%   BMI 28.12 kg/m²     Physical Exam  Vitals and nursing note reviewed.   Constitutional:       General: She is awake. She is not in acute distress.     Appearance: Normal appearance. She is well-developed.   HENT:      Head: Normocephalic and atraumatic.      Nose: Nose normal.      Mouth/Throat:      Mouth: Mucous membranes are moist.   Eyes:      Conjunctiva/sclera: Conjunctivae normal.   Cardiovascular:      Rate and Rhythm: Normal rate and regular rhythm.      Pulses: Normal pulses.      Heart sounds: Normal heart sounds. No murmur heard.  Pulmonary:      Effort: Pulmonary effort is normal. No respiratory distress.      Breath sounds: Normal breath sounds.   Abdominal:      General: Bowel sounds are normal.      Palpations: Abdomen is soft.      Tenderness: There is no abdominal tenderness.   Musculoskeletal:      Cervical back: Neck supple.      Right lower leg: No edema.      Left lower leg: No edema.   Skin:     General: Skin is warm and dry.   Neurological:      Mental Status: She is alert and oriented to person, place, and time.   Psychiatric:         Attention and Perception: Attention normal.         Mood and Affect: Mood normal.         Speech: Speech normal.         Behavior: Behavior normal. Behavior is cooperative.         Thought Content: Thought content normal.         Cognition and Memory: Cognition normal.         Judgment: Judgment normal.       Administrative Statements   I have spent a total time of 25 minutes in caring for this patient on the day of the visit/encounter including Diagnostic results, Prognosis, Risks and benefits of tx options, Instructions for management, Patient and family education, Importance of tx compliance, " Risk factor reductions, Impressions, Counseling / Coordination of care, Documenting in the medical record, Reviewing / ordering tests, medicine, procedures  , and Obtaining or reviewing history  .

## 2024-10-14 NOTE — PATIENT INSTRUCTIONS
_________________________________________________________________  YOU ARE DUE FOR YOUR ANNUAL PHYSICAL EXAM AFTER 3/7/2025.  REPEAT LABS ORDERED, PLEASE HAVE THEM DRAWN THE WEEK PRIOR TO YOUR VISIT (APPROXIMATELY 2/28/2025).  LABS HAVE BEEN ORDERED FOR YOU.   THESE LABS SHOULD BE DRAWN PRIOR TO YOUR NEXT VISIT.  YOU CAN HAVE THEM DRAWN UP TO 1 WEEK PRIOR TO YOUR NEXT VISIT.  HAVING YOUR BLOOD WORK WHEN YOU COME TO YOUR NEXT VISIT WILL ALLOW ME TO PROVIDE THE BEST MEDICAL CARE FOR YOU WITHOUT HAVING EITHER OF US PERFORM MORE WORK THAN NECESSARY.  PLEASE BE COMPLIANT WITH THIS REQUEST.   _____________________________________________________________________

## 2024-10-14 NOTE — ASSESSMENT & PLAN NOTE
This is a chronic and stable disease process.   We discussed diet and exercise  We discussed low-salt diet  Continue  Irbesartan

## 2024-10-15 ENCOUNTER — OFFICE VISIT (OUTPATIENT)
Dept: FAMILY MEDICINE CLINIC | Facility: CLINIC | Age: 54
End: 2024-10-15
Payer: COMMERCIAL

## 2024-10-15 VITALS
OXYGEN SATURATION: 96 % | TEMPERATURE: 97.6 F | WEIGHT: 169 LBS | BODY MASS INDEX: 28.16 KG/M2 | SYSTOLIC BLOOD PRESSURE: 160 MMHG | HEIGHT: 65 IN | HEART RATE: 92 BPM | DIASTOLIC BLOOD PRESSURE: 104 MMHG

## 2024-10-15 DIAGNOSIS — Z13.220 SCREENING FOR HYPERLIPIDEMIA: Chronic | ICD-10-CM

## 2024-10-15 DIAGNOSIS — Z12.12 SCREENING FOR COLORECTAL CANCER: ICD-10-CM

## 2024-10-15 DIAGNOSIS — Z12.11 SCREENING FOR COLORECTAL CANCER: ICD-10-CM

## 2024-10-15 DIAGNOSIS — Z00.00 ANNUAL PHYSICAL EXAM: Chronic | ICD-10-CM

## 2024-10-15 DIAGNOSIS — Z12.4 SCREENING FOR CERVICAL CANCER: ICD-10-CM

## 2024-10-15 DIAGNOSIS — Z23 ENCOUNTER FOR IMMUNIZATION: ICD-10-CM

## 2024-10-15 DIAGNOSIS — Z12.31 ENCOUNTER FOR SCREENING MAMMOGRAM FOR MALIGNANT NEOPLASM OF BREAST: ICD-10-CM

## 2024-10-15 DIAGNOSIS — E55.9 VITAMIN D DEFICIENCY: Chronic | ICD-10-CM

## 2024-10-15 DIAGNOSIS — Z12.31 BREAST CANCER SCREENING BY MAMMOGRAM: Chronic | ICD-10-CM

## 2024-10-15 DIAGNOSIS — F17.210 SMOKING GREATER THAN 20 PACK YEARS: ICD-10-CM

## 2024-10-15 DIAGNOSIS — L60.0 INGROWN NAIL OF GREAT TOE OF RIGHT FOOT: Chronic | ICD-10-CM

## 2024-10-15 DIAGNOSIS — Z12.11 COLON CANCER SCREENING: Chronic | ICD-10-CM

## 2024-10-15 DIAGNOSIS — S62.521D CLOSED DISPLACED FRACTURE OF DISTAL PHALANX OF RIGHT THUMB WITH ROUTINE HEALING: Primary | Chronic | ICD-10-CM

## 2024-10-15 DIAGNOSIS — F33.1 MODERATE EPISODE OF RECURRENT MAJOR DEPRESSIVE DISORDER (HCC): Chronic | ICD-10-CM

## 2024-10-15 DIAGNOSIS — I10 PRIMARY HYPERTENSION: Chronic | ICD-10-CM

## 2024-10-15 DIAGNOSIS — I73.00 RAYNAUD'S PHENOMENON WITHOUT GANGRENE: Chronic | ICD-10-CM

## 2024-10-15 DIAGNOSIS — Z13.1 SCREENING FOR DIABETES MELLITUS: Chronic | ICD-10-CM

## 2024-10-15 DIAGNOSIS — Z12.2 SCREENING FOR LUNG CANCER: Chronic | ICD-10-CM

## 2024-10-15 PROCEDURE — 90673 RIV3 VACCINE NO PRESERV IM: CPT

## 2024-10-15 PROCEDURE — 90471 IMMUNIZATION ADMIN: CPT

## 2024-10-15 PROCEDURE — 99213 OFFICE O/P EST LOW 20 MIN: CPT | Performed by: NURSE PRACTITIONER

## 2024-10-15 RX ORDER — CEPHALEXIN 500 MG/1
500 CAPSULE ORAL 2 TIMES DAILY
Qty: 20 CAPSULE | Refills: 0 | Status: SHIPPED | OUTPATIENT
Start: 2024-10-15 | End: 2024-10-25

## 2024-10-15 RX ORDER — FLUCONAZOLE 200 MG/1
200 TABLET ORAL DAILY
Qty: 10 TABLET | Refills: 0 | Status: SHIPPED | OUTPATIENT
Start: 2024-10-15 | End: 2024-10-25

## 2024-10-15 RX ORDER — TRAMADOL HYDROCHLORIDE 50 MG/1
100 TABLET ORAL EVERY 6 HOURS PRN
Qty: 180 TABLET | Refills: 0 | Status: SHIPPED | OUTPATIENT
Start: 2024-10-15 | End: 2024-10-15

## 2024-10-15 RX ORDER — TRAMADOL HYDROCHLORIDE 50 MG/1
100 TABLET ORAL EVERY 6 HOURS PRN
Qty: 180 TABLET | Refills: 0 | Status: SHIPPED | OUTPATIENT
Start: 2024-10-15

## 2024-10-15 NOTE — ASSESSMENT & PLAN NOTE
Referral to podiatry   Soaking in epsom salt   Will start her on keflex and diflucan             Orders:    cephalexin (KEFLEX) 500 mg capsule; Take 1 capsule (500 mg total) by mouth 2 (two) times a day for 10 days    fluconazole (DIFLUCAN) 200 mg tablet; Take 1 tablet (200 mg total) by mouth daily for 10 days    Ambulatory Referral to Podiatry; Future

## 2024-10-16 ENCOUNTER — APPOINTMENT (OUTPATIENT)
Dept: LAB | Facility: CLINIC | Age: 54
End: 2024-10-16
Payer: COMMERCIAL

## 2024-10-16 DIAGNOSIS — S62.521D CLOSED DISPLACED FRACTURE OF DISTAL PHALANX OF RIGHT THUMB WITH ROUTINE HEALING: Chronic | ICD-10-CM

## 2024-10-16 DIAGNOSIS — Z13.220 SCREENING FOR HYPERLIPIDEMIA: Chronic | ICD-10-CM

## 2024-10-16 DIAGNOSIS — E55.9 VITAMIN D DEFICIENCY: Chronic | ICD-10-CM

## 2024-10-16 DIAGNOSIS — Z00.00 ANNUAL PHYSICAL EXAM: Chronic | ICD-10-CM

## 2024-10-16 DIAGNOSIS — Z13.1 SCREENING FOR DIABETES MELLITUS: Chronic | ICD-10-CM

## 2024-10-16 LAB
25(OH)D3 SERPL-MCNC: 11.1 NG/ML (ref 30–100)
ALBUMIN SERPL BCG-MCNC: 3.8 G/DL (ref 3.5–5)
ALP SERPL-CCNC: 87 U/L (ref 34–104)
ALT SERPL W P-5'-P-CCNC: 7 U/L (ref 7–52)
ANION GAP SERPL CALCULATED.3IONS-SCNC: 9 MMOL/L (ref 4–13)
AST SERPL W P-5'-P-CCNC: 11 U/L (ref 13–39)
BASOPHILS # BLD AUTO: 0.04 THOUSANDS/ΜL (ref 0–0.1)
BASOPHILS NFR BLD AUTO: 1 % (ref 0–1)
BILIRUB SERPL-MCNC: 0.47 MG/DL (ref 0.2–1)
BUN SERPL-MCNC: 5 MG/DL (ref 5–25)
CALCIUM SERPL-MCNC: 8.8 MG/DL (ref 8.4–10.2)
CHLORIDE SERPL-SCNC: 106 MMOL/L (ref 96–108)
CHOLEST SERPL-MCNC: 181 MG/DL
CO2 SERPL-SCNC: 26 MMOL/L (ref 21–32)
CREAT SERPL-MCNC: 0.64 MG/DL (ref 0.6–1.3)
EOSINOPHIL # BLD AUTO: 0.12 THOUSAND/ΜL (ref 0–0.61)
EOSINOPHIL NFR BLD AUTO: 2 % (ref 0–6)
ERYTHROCYTE [DISTWIDTH] IN BLOOD BY AUTOMATED COUNT: 13.1 % (ref 11.6–15.1)
EST. AVERAGE GLUCOSE BLD GHB EST-MCNC: 117 MG/DL
GFR SERPL CREATININE-BSD FRML MDRD: 102 ML/MIN/1.73SQ M
GLUCOSE P FAST SERPL-MCNC: 85 MG/DL (ref 65–99)
HBA1C MFR BLD: 5.7 %
HCT VFR BLD AUTO: 43.9 % (ref 34.8–46.1)
HDLC SERPL-MCNC: 31 MG/DL
HGB BLD-MCNC: 14.1 G/DL (ref 11.5–15.4)
IMM GRANULOCYTES # BLD AUTO: 0.02 THOUSAND/UL (ref 0–0.2)
IMM GRANULOCYTES NFR BLD AUTO: 0 % (ref 0–2)
LDLC SERPL CALC-MCNC: 125 MG/DL (ref 0–100)
LYMPHOCYTES # BLD AUTO: 2.01 THOUSANDS/ΜL (ref 0.6–4.47)
LYMPHOCYTES NFR BLD AUTO: 25 % (ref 14–44)
MCH RBC QN AUTO: 29.9 PG (ref 26.8–34.3)
MCHC RBC AUTO-ENTMCNC: 32.1 G/DL (ref 31.4–37.4)
MCV RBC AUTO: 93 FL (ref 82–98)
MONOCYTES # BLD AUTO: 0.53 THOUSAND/ΜL (ref 0.17–1.22)
MONOCYTES NFR BLD AUTO: 7 % (ref 4–12)
NEUTROPHILS # BLD AUTO: 5.2 THOUSANDS/ΜL (ref 1.85–7.62)
NEUTS SEG NFR BLD AUTO: 65 % (ref 43–75)
NONHDLC SERPL-MCNC: 150 MG/DL
NRBC BLD AUTO-RTO: 0 /100 WBCS
PLATELET # BLD AUTO: 347 THOUSANDS/UL (ref 149–390)
PMV BLD AUTO: 9.7 FL (ref 8.9–12.7)
POTASSIUM SERPL-SCNC: 3.8 MMOL/L (ref 3.5–5.3)
PROT SERPL-MCNC: 6.4 G/DL (ref 6.4–8.4)
RBC # BLD AUTO: 4.71 MILLION/UL (ref 3.81–5.12)
SODIUM SERPL-SCNC: 141 MMOL/L (ref 135–147)
TRIGL SERPL-MCNC: 125 MG/DL
WBC # BLD AUTO: 7.92 THOUSAND/UL (ref 4.31–10.16)

## 2024-10-16 PROCEDURE — 36415 COLL VENOUS BLD VENIPUNCTURE: CPT

## 2024-10-16 PROCEDURE — 80061 LIPID PANEL: CPT

## 2024-10-16 PROCEDURE — 83036 HEMOGLOBIN GLYCOSYLATED A1C: CPT

## 2024-10-16 PROCEDURE — 80053 COMPREHEN METABOLIC PANEL: CPT

## 2024-10-16 PROCEDURE — 85025 COMPLETE CBC W/AUTO DIFF WBC: CPT

## 2024-10-16 PROCEDURE — 82306 VITAMIN D 25 HYDROXY: CPT

## 2024-10-17 DIAGNOSIS — E55.9 VITAMIN D DEFICIENCY: Primary | Chronic | ICD-10-CM

## 2024-10-17 RX ORDER — CHOLECALCIFEROL (VITAMIN D3) 125 MCG
5000 CAPSULE ORAL DAILY
Qty: 90 CAPSULE | Refills: 3 | Status: SHIPPED | OUTPATIENT
Start: 2024-10-17

## 2024-10-17 RX ORDER — ERGOCALCIFEROL 1.25 MG/1
50000 CAPSULE, LIQUID FILLED ORAL 2 TIMES WEEKLY
Qty: 56 CAPSULE | Refills: 1 | Status: SHIPPED | OUTPATIENT
Start: 2024-10-17

## 2024-10-24 ENCOUNTER — HOSPITAL ENCOUNTER (OUTPATIENT)
Dept: MRI IMAGING | Facility: HOSPITAL | Age: 54
Discharge: HOME | End: 2024-10-24
Payer: COMMERCIAL

## 2024-10-24 DIAGNOSIS — S62.521D CLOSED DISPLACED FRACTURE OF DISTAL PHALANX OF RIGHT THUMB WITH ROUTINE HEALING: Chronic | ICD-10-CM

## 2024-10-24 PROCEDURE — 73220 MRI UPPR EXTREMITY W/O&W/DYE: CPT

## 2024-10-24 PROCEDURE — A9585 GADOBUTROL INJECTION: HCPCS | Performed by: NURSE PRACTITIONER

## 2024-10-24 RX ORDER — GADOBUTROL 604.72 MG/ML
7 INJECTION INTRAVENOUS
Status: COMPLETED | OUTPATIENT
Start: 2024-10-24 | End: 2024-10-24

## 2024-10-24 RX ADMIN — GADOBUTROL 7 ML: 604.72 INJECTION INTRAVENOUS at 15:02

## 2024-10-30 ENCOUNTER — OFFICE VISIT (OUTPATIENT)
Dept: OBGYN CLINIC | Facility: CLINIC | Age: 54
End: 2024-10-30
Payer: COMMERCIAL

## 2024-10-30 VITALS
OXYGEN SATURATION: 98 % | DIASTOLIC BLOOD PRESSURE: 78 MMHG | WEIGHT: 168.8 LBS | HEART RATE: 81 BPM | RESPIRATION RATE: 16 BRPM | HEIGHT: 65 IN | SYSTOLIC BLOOD PRESSURE: 120 MMHG | BODY MASS INDEX: 28.12 KG/M2 | TEMPERATURE: 98.2 F

## 2024-10-30 DIAGNOSIS — S62.521D CLOSED DISPLACED FRACTURE OF DISTAL PHALANX OF RIGHT THUMB WITH ROUTINE HEALING: Primary | Chronic | ICD-10-CM

## 2024-10-30 PROCEDURE — 99214 OFFICE O/P EST MOD 30 MIN: CPT | Performed by: STUDENT IN AN ORGANIZED HEALTH CARE EDUCATION/TRAINING PROGRAM

## 2024-10-30 NOTE — PROGRESS NOTES
ASSESSMENT/PLAN:    Assessment:   54-year-old female with right thumb distal phalanx intra-articular fracture now healed with residual stiffness and swelling.  We discussed today that although imaging demonstrates the fracture is healed and the IP joint is congruent she has significant tenderness about this area primarily with range of motion.  Her main issue right now is the stiffness and the swelling.  I expressed that we could try to improve some of the swelling and pain with topical anti-inflammatories and improve range of motion with physical therapy however time will also be needed.  She should work on range of motion and I would expect it may take some time up to 6 months for her to see the recovery she is hoping for.  We also discussed that although the articular joint is not congruent any fracture into the joint does increase the risk of arthritis which she is now at risk for an IP joint.  The patient expressed understanding.  Her plan right now will be to start her on physical therapy which she will then develop a home regimen.  She will use topical Voltaren cream around the thumb with help with some of the pain.  She will also ice the thumb after therapy sessions. The patient verbalized understanding of exam findings and treatment plan. We engaged in the shared decision-making process and treatment options were discussed at length with the patient. Surgical and conservative management discussed today along with risks and benefits.      Plan:   I had a discussion with the patient regarding my clinical findings, diagnosis, and treatment plan.  All questions answered.  Reviewed previous A1C.  Voltaren topically for pain  Referral to physical therapy for range of motion exercises and encouragement of home exercise regimen  Next Visit: 6 weeks for range of motion check.  Can obtain x-rays at that time      _____________________________________________________  CHIEF COMPLAINT:  Right thumb  pain      SUBJECTIVE:  Caitlin Jarrett is a 54 y.o. right hand dominant female presenting for evaluation of right thumb  distal phalanx fracture. The patient states the injury occurred while playing pickleball. After injury he was initially evaluated by Dr. Lofton.  They were placed in a splint and referred for follow-up.  Her issue has been she has continued pain with thumb range of motion and the thumb is more stiff than she had expected.  She was told initially that she would have a full recovery by 6 weeks and it is now 3 months and she has not had this.  She still has some pain with range of motion although she is able to return to most daily activities.    They do not have numbness or tingling in the hand including no numbness or tingling in the median nerve distribution.  There is no pain in the elbow or shoulder.  Here to establish care.    DOI: 2024    Occupation: [employer]      PAST MEDICAL HISTORY:  Past Medical History:   Diagnosis Date    Anxiety     Depression 2017    Hypertension        PAST SURGICAL HISTORY:  Past Surgical History:   Procedure Laterality Date     SECTION      1992 and        FAMILY HISTORY:  Family History   Problem Relation Age of Onset    Hypertension Mother     Depression Mother     Breast cancer Mother     Uterine cancer Mother     Cancer Mother     Hyperlipidemia Father     Hypertension Father     Kidney disease Father     Heart attack Father     Stroke Father     Heart disease Father        SOCIAL HISTORY:  Social History     Tobacco Use    Smoking status: Every Day     Current packs/day: 1.00     Average packs/day: 1 pack/day for 34.8 years (34.8 ttl pk-yrs)     Types: Cigarettes     Start date: 1990    Smokeless tobacco: Never   Vaping Use    Vaping status: Never Used   Substance Use Topics    Alcohol use: Not Currently    Drug use: Never       MEDICATIONS:    Current Outpatient Medications:     Cholecalciferol (Vitamin D) 125 MCG (5000  "UT) CAPS, Take 5,000 Units by mouth in the morning, Disp: 90 capsule, Rfl: 3    ergocalciferol (VITAMIN D2) 50,000 units, Take 1 capsule (50,000 Units total) by mouth 2 (two) times a week, Disp: 56 capsule, Rfl: 1    irbesartan (AVAPRO) 150 mg tablet, TAKE 1 TABLET BY MOUTH AT BEDTIME, Disp: 100 tablet, Rfl: 1    sertraline (ZOLOFT) 50 mg tablet, Take 1 tablet (50 mg total) by mouth daily, Disp: 90 tablet, Rfl: 3    traMADol (Ultram) 50 mg tablet, Take 2 tablets (100 mg total) by mouth every 6 (six) hours as needed for moderate pain, Disp: 180 tablet, Rfl: 0    ALLERGIES:  No Known Allergies    REVIEW OF SYSTEMS:  Pertinent items are noted in HPI.  A comprehensive review of systems was negative.    LABS:  HgA1c:   Lab Results   Component Value Date    HGBA1C 5.7 (H) 10/16/2024     BMP:   Lab Results   Component Value Date    CALCIUM 8.8 10/16/2024    K 3.8 10/16/2024    CO2 26 10/16/2024     10/16/2024    BUN 5 10/16/2024    CREATININE 0.64 10/16/2024         _____________________________________________________  PHYSICAL EXAMINATION:  Vital signs: /78   Pulse 81   Temp 98.2 °F (36.8 °C) (Temporal)   Resp 16   Ht 5' 5\" (1.651 m)   Wt 76.6 kg (168 lb 12.8 oz)   SpO2 98%   BMI 28.09 kg/m²   General: well developed and well nourished, alert, oriented times 3, and appears comfortable  Psychiatric: Normal  HEENT: Trachea Midline, No torticollis  Cardiovascular: No discernable arrhythmia  Pulmonary: No wheezing or stridor  Abdomen: No rebound or guarding  Extremities: No peripheral edema  Skin: No masses, erythema, lacerations, fluctation, ulcerations  Neurovascular: Sensation Intact to the Median, Ulnar, Radial Nerve, Motor Intact to the Median, Ulnar, Radial Nerve, and Pulses Intact    MUSCULOSKELETAL EXAMINATION:  Right thumb  Skin is intact about the thumb although it is notably more swollen than the contralateral side.  There is no erythema or overlying skin changes or evidence of infection  EPL " and FPL are intact although thumb range of motion is limited to about full extension to about 30 degrees of flexion at the IP joint.  This is both passively and actively.  There is minimal tenderness to palpation about the IP joint and no tenderness to palpation throughout the remainder of the hand.  There is no locking or catching of the thumb with motion and no pain over the A1 pulley     TTP:  Radial styloid: no   Scaphoid tubercle: no   Anatomic snuff box: no  SL interval: no   Ulnocarpal joint: no   Pisotriquetral compression: no   ECU: no   Fovea sign: negative   DRUJ stable in pronation, neutral, and pronation.   Range of Motion:  Elbow: extension/flexion 0/140  Forearm: pronation/supination 80/80  Wrist: extension/flexion 70/70  Digit: full AROM in DIP, PIP, MP joints aside from noted above in the thumb  Motor Exam: firing AIN/PIN/U  Sensory Exam: Sensation intact to light touch in FDWS (radial), volar IF (median), volar SF (ulnar)  Vascular Exam: < 2 sec capillary refill     _____________________________________________________  STUDIES REVIEWED:  I reviewed imaging in PACS of the thumb x-rays from 8/22/2024 which demonstrate an intra-articular fracture of the thumb distal phalanx with minimal displacement.  No other fractures noted    An MRI from 10/24/2024 of the thumb demonstrates that the intra-articular fracture is healed with a congruent articular surface.  The soft tissue ligamentous and tendinous structures about this area are intact.  No other fractures noted.      PROCEDURES PERFORMED:  Procedures

## 2024-11-06 LAB — COLOGUARD RESULT REPORTABLE: NEGATIVE

## 2024-11-11 ENCOUNTER — OFFICE VISIT (OUTPATIENT)
Age: 54
End: 2024-11-11
Payer: COMMERCIAL

## 2024-11-11 VITALS
HEIGHT: 65 IN | WEIGHT: 168 LBS | DIASTOLIC BLOOD PRESSURE: 92 MMHG | SYSTOLIC BLOOD PRESSURE: 152 MMHG | BODY MASS INDEX: 27.99 KG/M2 | HEART RATE: 80 BPM

## 2024-11-11 DIAGNOSIS — L60.0 INGROWN NAIL OF GREAT TOE OF RIGHT FOOT: Chronic | ICD-10-CM

## 2024-11-11 PROCEDURE — 99203 OFFICE O/P NEW LOW 30 MIN: CPT

## 2024-11-11 PROCEDURE — 11750 EXCISION NAIL&NAIL MATRIX: CPT

## 2024-11-11 NOTE — PROGRESS NOTES
Ambulatory Visit  Name: Caitlin Jarrett      : 1970      MRN: [id number]  Encounter Provider: Oskar Soriano DPM  Encounter Date: 2024   Encounter department: [facility] PODIATRY [place] [facility]    Assessment & Plan  Ingrown nail of great toe of right foot    Orders:    Ambulatory Referral to Podiatry    Plan:  Diagnosis and options discussed with patient.  Patient agreeable to the plan as stated below.  Total nail avulsion procedure performed phenol at both corners, see procedure note.   Patient is to soak in warm water daily starting tomorrow followed by triple antibiotic/Aquafor/Vaseline dressing.   If there are any acute signs of infection (redness, swelling, purulent drainage, fever, chills), patient was instructed to go to the emergency department for evaluation.  Follow up in 2-3 weeks for recheck.    History of Present Illness     Caitlin Jarrett is a 54 y.o. female who presents with painful ingrown nail right great toe.  She states that she has been in pain for the past month with some redness and swelling.  She did take a week of oral antibiotics a few weeks ago and toe has since improved significantly however she would still like nail fixed so it does not come back in the future.  She is accompanied by her  today.    History obtained from : patient  Review of Systems  Current Outpatient Medications on File Prior to Visit   Medication Sig Dispense Refill    Cholecalciferol (Vitamin D) 125 MCG (5000 UT) CAPS Take 5,000 Units by mouth in the morning 90 capsule 3    Diclofenac Sodium (VOLTAREN) 1 % Apply 2 g topically 4 (four) times a day 100 g 1    ergocalciferol (VITAMIN D2) 50,000 units Take 1 capsule (50,000 Units total) by mouth 2 (two) times a week 56 capsule 1    irbesartan (AVAPRO) 150 mg tablet TAKE 1 TABLET BY MOUTH AT BEDTIME 100 tablet 1    sertraline (ZOLOFT) 50 mg tablet Take 1 tablet (50 mg total) by mouth daily 90 tablet 3    traMADol (Ultram) 50 mg tablet Take 2  "tablets (100 mg total) by mouth every 6 (six) hours as needed for moderate pain 180 tablet 0     No current facility-administered medications on file prior to visit.          Objective     /92 (BP Location: Left arm, Patient Position: Sitting, Cuff Size: Large)   Pulse 80   Ht 5' 5\" (1.651 m)   Wt 76.2 kg (168 lb)   BMI 27.96 kg/m²     Physical Exam  Vascular:  -DP and PT pulses intact b/l  -Capillary refill time <2 sec b/l  -Digital hair growth: Present  -Skin temp: WNL    Neuro:  -Light sensation intact bilaterally  -Protective sensation intact bilaterally with 10/10 points felt with [place] monofilament    MSK:  -Pain on palpation of medial and lateral aspect of right 1st digit  -No gross deformities noted   -Active range of motion lesser digits intact  -Manual muscle testing is 5/5 to all muscle compartments of the lower extremity  -Ankle dorsiflexion >10 degrees with knee extended, and knee flexed    Derm:  -medial and lateral aspect of right 1st digit periungual tissue is erythematous, edematous, with mild serous drainage noted.  The proximal portion of the digital nail can be seen under lapping the periungual tissue.  This is consistent with onychocryptosis  -No noted interdigital maceration, peeling, malodor  -No calluses noted on exam    Nail removal    Date/Time: 11/11/2024 3:00 PM    Performed by: Oskar Soriano DPM  Authorized by: Oskar Soriano DPM    Patient location:  Clinic  Indications / Diagnosis:  Ingrown nail  Universal Protocol:  procedure performed by consultantConsent: Verbal consent obtained.  Risks and benefits: risks, benefits and alternatives were discussed  Consent given by: patient  Time out: Immediately prior to procedure a \"time out\" was called to verify the correct patient, procedure, equipment, support staff and site/side marked as required.  Timeout called at: 11/11/2024 3:00 PM.  Patient understanding: patient states understanding of the procedure being " performed    Pre-procedure details:     Skin preparation:  Betadine    Preparation: Patient was prepped and draped in the usual sterile fashion    Anesthesia (see MAR for exact dosages):     Anesthesia method:  Nerve block    Block needle gauge:  25 G    Block anesthetic:  Lidocaine 1% w/o epi    Block technique:  Digital Block    Block outcome:  Anesthesia achieved  Nail Removal:     Nail removed:  Complete    Nail bed sutured: no    Ingrown nail:     Wedge excision of skin: no      Nail matrix removed or ablated:  Complete  Post-procedure details:     Dressing:  4x4 sterile gauze, antibiotic ointment, gauze roll and petrolatum-impregnated gauze    Patient tolerance of procedure:  Tolerated well, no immediate complications  Comments:      Discussion with patient was completed today regarding diagnosis and potential etiologies as well as treatment options for this ingrown nail diagnosis.  Discussed how to avoid recurrence and possible treatment options if recurrence does occur.    Antibiotic ointment applied to border with bandage dressing  Pt instructed to keep dressing intact for 24 hours.  After this time use triple antibiotic ointment to the area and a dry dressing changed daily  Return to clinic in about 3 weeks for reevaluation.  If notice any redness, swelling, drainage, or excessive pain or signs of infection to notify the office sooner.  Procedure completed without incident.  Do not soak foot.      Procedure in detail: Once the toe was anesthetized, the area was scrubbed and prepped with sterile technique.  A Tourni-Cot was used to the level on the toe.  A hemostat was used to lift up the entire toenail.  Care was taken to protect the underlying nail bed.  A hemostat was then used to remove the nail in toto.  This was then checked that the entire nail was removed. A currette was used to remove the nail matrix from the base of the nail at the proximal corner, three applications of 90% phenol were applied to  the border with cotton swabs with alcohol wash inbetween.   Hemostasis was achieved and dressings were applied.

## 2024-11-13 PROBLEM — Z12.12 SCREENING FOR COLORECTAL CANCER: Status: RESOLVED | Noted: 2024-10-14 | Resolved: 2024-11-13

## 2024-11-13 PROBLEM — Z12.11 SCREENING FOR COLORECTAL CANCER: Status: RESOLVED | Noted: 2024-10-14 | Resolved: 2024-11-13

## 2024-12-03 ENCOUNTER — VBI (OUTPATIENT)
Dept: ADMINISTRATIVE | Facility: OTHER | Age: 54
End: 2024-12-03

## 2024-12-03 NOTE — TELEPHONE ENCOUNTER
12/03/24 2:43 PM     Chart reviewed for CRC: Cologuard was/were submitted to the patient's insurance.     Tiffanie Montalvo MA   [facility]

## 2024-12-24 ENCOUNTER — OFFICE VISIT (OUTPATIENT)
Dept: FAMILY MEDICINE CLINIC | Facility: CLINIC | Age: 54
End: 2024-12-24
Payer: COMMERCIAL

## 2024-12-24 VITALS
SYSTOLIC BLOOD PRESSURE: 126 MMHG | OXYGEN SATURATION: 98 % | RESPIRATION RATE: 18 BRPM | HEIGHT: 65 IN | HEART RATE: 92 BPM | BODY MASS INDEX: 27.49 KG/M2 | DIASTOLIC BLOOD PRESSURE: 78 MMHG | TEMPERATURE: 98.3 F | WEIGHT: 165 LBS

## 2024-12-24 DIAGNOSIS — J06.9 UPPER RESPIRATORY TRACT INFECTION, UNSPECIFIED TYPE: Primary | ICD-10-CM

## 2024-12-24 DIAGNOSIS — J01.00 ACUTE NON-RECURRENT MAXILLARY SINUSITIS: ICD-10-CM

## 2024-12-24 DIAGNOSIS — R05.1 ACUTE COUGH: ICD-10-CM

## 2024-12-24 LAB
SL AMB POCT RAPID FLU A: NORMAL
SL AMB POCT RAPID FLU B: NORMAL

## 2024-12-24 PROCEDURE — 99214 OFFICE O/P EST MOD 30 MIN: CPT

## 2024-12-24 PROCEDURE — 87804 INFLUENZA ASSAY W/OPTIC: CPT

## 2024-12-24 RX ORDER — FLUTICASONE PROPIONATE 50 MCG
2 SPRAY, SUSPENSION (ML) NASAL DAILY
Qty: 16 G | Refills: 2 | Status: SHIPPED | OUTPATIENT
Start: 2024-12-24

## 2024-12-24 RX ORDER — BENZONATATE 100 MG/1
100 CAPSULE ORAL 3 TIMES DAILY PRN
Qty: 20 CAPSULE | Refills: 0 | Status: SHIPPED | OUTPATIENT
Start: 2024-12-24

## 2024-12-24 RX ORDER — DEXTROMETHORPHAN HYDROBROMIDE AND PROMETHAZINE HYDROCHLORIDE 15; 6.25 MG/5ML; MG/5ML
5 SYRUP ORAL 4 TIMES DAILY PRN
Qty: 250 ML | Refills: 0 | Status: SHIPPED | OUTPATIENT
Start: 2024-12-24

## 2024-12-24 NOTE — PROGRESS NOTES
Name: Caitlin Jarrett      : 1970      MRN: [id number]  Encounter Provider: Kelli Llamas PA-C  Encounter Date: 2024   Encounter department: [facility] CARE  :  Assessment & Plan  Upper respiratory tract infection, unspecified type  -Flu negative today   -Most likely URI or sinusitis   Orders:  •  POCT rapid flu A and B  •  fluticasone (FLONASE) 50 mcg/act nasal spray; 2 sprays into each nostril daily    Acute cough  -Will try Tessalon and Phenergan as needed for her cough   Orders:  •  POCT rapid flu A and B  •  benzonatate (TESSALON PERLES) 100 mg capsule; Take 1 capsule (100 mg total) by mouth 3 (three) times a day as needed for cough  •  promethazine-dextromethorphan (PHENERGAN-DM) 6.25-15 mg/5 mL oral syrup; Take 5 mL by mouth 4 (four) times a day as needed for cough    Acute non-recurrent maxillary sinusitis  -Ordered Augmentin for her to take x7 days  -Advised her to take with food and probiotic to avoid GI upset   -If fever spikes again, told to keep taking Tylenol as needed  -Rest and hydration  -She is welcome to message or call back if symptoms are worsening or not getting better  Orders:  •  amoxicillin-clavulanate (AUGMENTIN) 875-125 mg per tablet; Take 1 tablet by mouth every 12 (twelve) hours for 7 days           History of Present Illness     Patient is a 54-year-old female who presents today with a 5 day history of cough, congestion, sinus pressure, ear pain/pressure, and sore throat. She states that she has had a fever on and off highest of 102 but that has since passed. She tested for COVID at home on 24 and that was negative. She has tried tylenol cold and flu at home with no relief.                Review of Systems   Constitutional:  Positive for chills, fatigue and fever.   HENT:  Positive for congestion, ear pain, postnasal drip, rhinorrhea, sinus pressure, sinus pain and sore throat.    Eyes:  Negative for pain.   Respiratory:  Positive for cough.  "Negative for shortness of breath.    Cardiovascular:  Negative for chest pain and palpitations.   Gastrointestinal:  Negative for abdominal pain, constipation, diarrhea, nausea and vomiting.   Genitourinary:  Negative for dysuria and hematuria.   Musculoskeletal:  Negative for arthralgias and back pain.   Skin:  Negative for color change and rash.   Neurological:  Positive for headaches. Negative for syncope and numbness.   All other systems reviewed and are negative.      Objective   /78 (Patient Position: Sitting, Cuff Size: Standard)   Pulse 92   Temp 98.3 °F (36.8 °C) (Tympanic)   Resp 18   Ht 5' 5\" (1.651 m)   Wt 74.8 kg (165 lb)   SpO2 98%   BMI 27.46 kg/m²      Physical Exam  Vitals and nursing note reviewed.   Constitutional:       General: She is not in acute distress.     Appearance: She is well-developed.   HENT:      Head: Normocephalic and atraumatic.      Right Ear: Tympanic membrane is erythematous and bulging.      Nose: Congestion present.      Right Sinus: Maxillary sinus tenderness present.      Left Sinus: Maxillary sinus tenderness present.      Mouth/Throat:      Mouth: Mucous membranes are moist.      Pharynx: Posterior oropharyngeal erythema present.   Eyes:      Conjunctiva/sclera: Conjunctivae normal.   Cardiovascular:      Rate and Rhythm: Normal rate and regular rhythm.      Heart sounds: Normal heart sounds. No murmur heard.  Pulmonary:      Effort: Pulmonary effort is normal. No respiratory distress.      Breath sounds: No wheezing or rhonchi.   Abdominal:      Palpations: Abdomen is soft.      Tenderness: There is no abdominal tenderness.   Musculoskeletal:         General: No swelling.      Cervical back: Neck supple.   Skin:     General: Skin is warm and dry.      Capillary Refill: Capillary refill takes less than 2 seconds.   Neurological:      Mental Status: She is alert and oriented to person, place, and time.   Psychiatric:         Mood and Affect: Mood normal.       "   Behavior: Behavior normal.         Thought Content: Thought content normal.         Judgment: Judgment normal.       Administrative Statements   I have spent a total time of 35 minutes in caring for this patient on the day of the visit/encounter including Diagnostic results, Prognosis, Risks and benefits of tx options, Instructions for management, Patient and family education, Importance of tx compliance, Risk factor reductions, Impressions, Counseling / Coordination of care, Documenting in the medical record, Reviewing / ordering tests, medicine, procedures  , and Obtaining or reviewing history  . Topics discussed with the patient / family include symptom assessment and management and medication review.

## 2025-01-08 ENCOUNTER — TELEPHONE (OUTPATIENT)
Age: 55
End: 2025-01-08

## 2025-01-08 DIAGNOSIS — J01.00 ACUTE NON-RECURRENT MAXILLARY SINUSITIS: ICD-10-CM

## 2025-01-08 DIAGNOSIS — J01.00 ACUTE NON-RECURRENT MAXILLARY SINUSITIS: Primary | ICD-10-CM

## 2025-01-08 DIAGNOSIS — I10 PRIMARY HYPERTENSION: ICD-10-CM

## 2025-01-08 DIAGNOSIS — R05.1 ACUTE COUGH: ICD-10-CM

## 2025-01-08 RX ORDER — IRBESARTAN 150 MG/1
150 TABLET ORAL
Qty: 100 TABLET | Refills: 1 | Status: SHIPPED | OUTPATIENT
Start: 2025-01-08

## 2025-01-08 NOTE — TELEPHONE ENCOUNTER
TWO REFILLS:    Medication: irbesartan (AVAPRO)    Dose/Frequency: 150 mg tablet / Take 1 tablet by mouth at bedtime    Quantity: 100 tablet    Medication:  promethazine-dextromethorphan (PHENERGAN-DM)    Dose/Frequency:  6.25-15 mg/5 mL oral syrup / Take 5 mL by mouth 4 (four) times a day as needed for cough    Pharmacy: [facility]/pharmacy #[id number] - [place] - [address]    Office:   [x] PCP/Provider - ZAHRAA Knowles  [] Speciality/Provider -     Does the patient have enough for 3 days?   [x] Yes   [] No - Send as HP to POD

## 2025-01-08 NOTE — TELEPHONE ENCOUNTER
Patient complains of ongoing sore throat and earache from postnasal drip and would like a refill of the amoxicillin-clavulanate (AUGMENTIN) 875-125 mg per tablet that she had been prescribed on 12/24.  Med not listed on current med list.  Please send to [facility] in [place].  Please advise.

## 2025-01-09 RX ORDER — DEXTROMETHORPHAN HYDROBROMIDE AND PROMETHAZINE HYDROCHLORIDE 15; 6.25 MG/5ML; MG/5ML
5 SYRUP ORAL 4 TIMES DAILY PRN
Qty: 250 ML | Refills: 0 | Status: SHIPPED | OUTPATIENT
Start: 2025-01-09

## 2025-01-09 RX ORDER — IRBESARTAN 150 MG/1
150 TABLET ORAL
Qty: 100 TABLET | Refills: 1 | OUTPATIENT
Start: 2025-01-09

## 2025-01-15 DIAGNOSIS — J06.9 UPPER RESPIRATORY TRACT INFECTION, UNSPECIFIED TYPE: ICD-10-CM

## 2025-01-15 RX ORDER — FLUTICASONE PROPIONATE 50 MCG
SPRAY, SUSPENSION (ML) NASAL
Qty: 48 ML | Refills: 1 | Status: SHIPPED | OUTPATIENT
Start: 2025-01-15

## 2025-02-03 DIAGNOSIS — S62.521D CLOSED DISPLACED FRACTURE OF DISTAL PHALANX OF RIGHT THUMB WITH ROUTINE HEALING: Chronic | ICD-10-CM

## 2025-02-03 DIAGNOSIS — E55.9 VITAMIN D DEFICIENCY: Chronic | ICD-10-CM

## 2025-02-03 DIAGNOSIS — F33.1 MODERATE EPISODE OF RECURRENT MAJOR DEPRESSIVE DISORDER (HCC): ICD-10-CM

## 2025-02-05 RX ORDER — TRAMADOL HYDROCHLORIDE 50 MG/1
100 TABLET ORAL EVERY 6 HOURS PRN
Qty: 180 TABLET | Refills: 0 | Status: SHIPPED | OUTPATIENT
Start: 2025-02-05

## 2025-02-05 RX ORDER — ERGOCALCIFEROL 1.25 MG/1
50000 CAPSULE, LIQUID FILLED ORAL 2 TIMES WEEKLY
Qty: 56 CAPSULE | Refills: 0 | Status: SHIPPED | OUTPATIENT
Start: 2025-02-06

## 2025-03-07 ENCOUNTER — OFFICE VISIT (OUTPATIENT)
Dept: FAMILY MEDICINE CLINIC | Facility: CLINIC | Age: 55
End: 2025-03-07
Payer: COMMERCIAL

## 2025-03-07 VITALS
BODY MASS INDEX: 28.49 KG/M2 | OXYGEN SATURATION: 97 % | DIASTOLIC BLOOD PRESSURE: 76 MMHG | TEMPERATURE: 96.6 F | WEIGHT: 171 LBS | HEART RATE: 71 BPM | RESPIRATION RATE: 18 BRPM | HEIGHT: 65 IN | SYSTOLIC BLOOD PRESSURE: 132 MMHG

## 2025-03-07 DIAGNOSIS — I73.00 RAYNAUD'S PHENOMENON WITHOUT GANGRENE: Chronic | ICD-10-CM

## 2025-03-07 DIAGNOSIS — R73.03 PREDIABETES: ICD-10-CM

## 2025-03-07 DIAGNOSIS — Z00.00 ANNUAL PHYSICAL EXAM: Primary | Chronic | ICD-10-CM

## 2025-03-07 DIAGNOSIS — Z12.4 SCREENING FOR CERVICAL CANCER: ICD-10-CM

## 2025-03-07 DIAGNOSIS — R73.01 IMPAIRED FASTING GLUCOSE: ICD-10-CM

## 2025-03-07 DIAGNOSIS — E55.9 VITAMIN D DEFICIENCY: Chronic | ICD-10-CM

## 2025-03-07 DIAGNOSIS — F33.1 MODERATE EPISODE OF RECURRENT MAJOR DEPRESSIVE DISORDER (HCC): Chronic | ICD-10-CM

## 2025-03-07 DIAGNOSIS — Z12.31 BREAST CANCER SCREENING BY MAMMOGRAM: Chronic | ICD-10-CM

## 2025-03-07 DIAGNOSIS — Z12.2 SCREENING FOR LUNG CANCER: Chronic | ICD-10-CM

## 2025-03-07 DIAGNOSIS — F11.20 OPIOID DEPENDENCE, UNCOMPLICATED (HCC): ICD-10-CM

## 2025-03-07 DIAGNOSIS — I10 PRIMARY HYPERTENSION: Chronic | ICD-10-CM

## 2025-03-07 DIAGNOSIS — T23.171A SUPERFICIAL BURN OF RIGHT WRIST, INITIAL ENCOUNTER: Chronic | ICD-10-CM

## 2025-03-07 DIAGNOSIS — Z12.31 ENCOUNTER FOR SCREENING MAMMOGRAM FOR BREAST CANCER: ICD-10-CM

## 2025-03-07 DIAGNOSIS — E78.00 PURE HYPERCHOLESTEROLEMIA: ICD-10-CM

## 2025-03-07 DIAGNOSIS — F17.210 SMOKING GREATER THAN 20 PACK YEARS: ICD-10-CM

## 2025-03-07 DIAGNOSIS — Z79.899 ON LONG TERM DRUG THERAPY: ICD-10-CM

## 2025-03-07 PROCEDURE — 99396 PREV VISIT EST AGE 40-64: CPT | Performed by: NURSE PRACTITIONER

## 2025-03-07 RX ORDER — CEPHALEXIN 500 MG/1
500 CAPSULE ORAL 2 TIMES DAILY
Qty: 14 CAPSULE | Refills: 0 | Status: SHIPPED | OUTPATIENT
Start: 2025-03-07 | End: 2025-03-14

## 2025-03-07 RX ORDER — IRBESARTAN 150 MG/1
150 TABLET ORAL
Qty: 100 TABLET | Refills: 1 | Status: SHIPPED | OUTPATIENT
Start: 2025-03-07

## 2025-03-07 NOTE — ASSESSMENT & PLAN NOTE
Orders:  •  Lipid Panel with Direct LDL reflex; Future  •  Lipid Panel with Direct LDL reflex; Future

## 2025-03-07 NOTE — ASSESSMENT & PLAN NOTE
Orders:  •  irbesartan (AVAPRO) 150 mg tablet; Take 1 tablet (150 mg total) by mouth daily at bedtime

## 2025-03-07 NOTE — ASSESSMENT & PLAN NOTE
Continue tramadol  Will return for urine drug screen at a later date - after hours and weekend - unable to send       This patient has a Toxicology test scheduled for his/her appointment.   Patient agrees to provide an Oral or Urine Sample for Toxicology purposes at any time requested.    Scheduled Medication Review:   The Opioid and Controlled Substance Paperwork was filled out.    This patient was educated on side effects, risks of taking this type of medication which include abuse, misuse, dependence, addiction and tolerance.    The Risk Benefit Disclosure includes:  ??The increased risk of respratory depression and death with opioids and controlled substance use has been discussed along with the benefits of opioid and controlled substance use.   This is includes the marked increase in respiratory depression and death when used in combination with benzodlazepine medications, muscle relaxers, sedatives, MAOls, illicit drugs, and alcohol.  ???The risk of opioid and controlled substance addiction as well as the proper prescribed use of opioids and controlled substances been discussed.  This includes instruction to lock away and secure opioids and controlled substances from the reach of children and pets and not to share, sell, or distribute prescribed controlled substances to anyone else as such behavior will result in discontinuation of opiold therapy and controlled substances.  ??The patient has been advised that discontinuation of alcohol use is a requirement of initiation and continuation of opioid therapy and controlled substance therapy.  ???The risks of dizziness, drowsiness and motor impairment been discussed with the patient with respect to opioid and controlled substance use, and the patlent been advised not to drive or operate any heavy machinery or engage in any safety sensitive tasks while using opioids or controlled substances.  All questions were answered including different dosing levels, increasing  and decreasing of of this medication.  We discussed their continued open discussions with the PCP in order to make sure that they are on the correct dose.     We reviewed the potential side effects of the medications including, but are not limited to, constipation, diarrhea, nausea/upset stomach, drowsiness, fatigue, dizziness, urinary retention, visual changes, insomnia, headaches, nightmares, slowed breathing while sleeping, UTI issues, impaired judgment, addiction issues and the risk of fatal overdose if not taken as prescribed.   We discussed the importance of notifying the office/provider immediately of any side effects.   We discussed the importance of immediate notification if there are any feelings of suicidality thoughts or behaviors   We discussed the importance of contacting the office if he has any tachycardia or fainting situations.   We discussed that sharing medications is a felony.   We discussed other side effects such as QT prolongation, risks of Myocardial Infarction (heart attack), stroke and sudden death.   We discussed immediate notification if there is any seizure-like activity.    We discussed issues with angioedema as an anaphylactic reactions.      I have personally reviewed the [place] Drug Monitoring Program (PDMP) website prior to prescribing this medication and they have been found to be appropriate for this medication.  At this point in time, the patient is showing no signs of addiction, abuse, diversion or suicidal ideation.  The patient's use has been found to be appropriate without any concerns for misuse by the patient.   The patient's current conditions require continued scheduled medication use at this time.   Future review for continued appropriate medication use and misuse will continue.      ???The patient been advised to the rationale and requirement of using the PDMP, UDS, Pill Counts, and pain assessment tools to initiate and continue opioid and controlled substance  therapy.  The patient has also been advised as to the appropriate way to call in for refill of medication, allowing for a five to seven day time frame for medications refills.    The patient understands and agrees to use these medications only as prescribed. At this point in time, the patient is showing no signs of addiction, abuse, diversion or suicidal ideation.  All the patient's questions were answered to their satisfaction.    This patient has tried both non-drug (yoga, meditation) and drugs that do not contain opioids, barbiturates, benzodiazepines, and stimulants.   The requested drug will be used with other drugs and closely monitored.

## 2025-03-07 NOTE — PATIENT INSTRUCTIONS
"_________________________________________________________________  YOU ARE DUE FOR YOUR ANNUAL PHYSICAL EXAM AFTER 3/7/2026.  REPEAT LABS ORDERED, PLEASE HAVE THEM DRAWN THE WEEK PRIOR TO YOUR VISIT (APPROXIMATELY 2/28/2026).  LABS HAVE BEEN ORDERED FOR YOU.   THESE LABS SHOULD BE DRAWN PRIOR TO YOUR NEXT VISIT.  YOU CAN HAVE THEM DRAWN UP TO 1 WEEK PRIOR TO YOUR NEXT VISIT.  HAVING YOUR BLOOD WORK WHEN YOU COME TO YOUR NEXT VISIT WILL ALLOW ME TO PROVIDE THE BEST MEDICAL CARE FOR YOU WITHOUT HAVING EITHER OF US PERFORM MORE WORK THAN NECESSARY.  PLEASE BE COMPLIANT WITH THIS REQUEST.   _____________________________________________________________________  Patient Education     Routine physical for adults   The Basics   Written by the doctors and editors at [facility]   What is a physical? -- A physical is a routine visit, or \"check-up,\" with your doctor. You might also hear it called a \"wellness visit\" or \"preventive visit.\"  During each visit, the doctor will:   Ask about your physical and mental health   Ask about your habits, behaviors, and lifestyle   Do an exam   Give you vaccines if needed   Talk to you about any medicines you take   Give advice about your health   Answer your questions  Getting regular check-ups is an important part of taking care of your health. It can help your doctor find and treat any problems you have. But it's also important for preventing health problems.  A routine physical is different from a \"sick visit.\" A sick visit is when you see a doctor because of a health concern or problem. Since physicals are scheduled ahead of time, you can think about what you want to ask the doctor.  How often should I get a physical? -- It depends on your age and health. In general, for people age 21 years and older:   If you are younger than 50 years, you might be able to get a physical every 3 years.   If you are 50 years or older, your doctor might recommend a physical every year.  If you have an " "ongoing health condition, like diabetes or high blood pressure, your doctor will probably want to see you more often.  What happens during a physical? -- In general, each visit will include:   Physical exam - The doctor or nurse will check your height, weight, heart rate, and blood pressure. They will also look at your eyes and ears. They will ask about how you are feeling and whether you have any symptoms that bother you.   Medicines - It's a good idea to bring a list of all the medicines you take to each doctor visit. Your doctor will talk to you about your medicines and answer any questions. Tell them if you are having any side effects that bother you. You should also tell them if you are having trouble paying for any of your medicines.   Habits and behaviors - This includes:   Your diet   Your exercise habits   Whether you smoke, drink alcohol, or use drugs   Whether you are sexually active   Whether you feel safe at home  Your doctor will talk to you about things you can do to improve your health and lower your risk of health problems. They will also offer help and support. For example, if you want to quit smoking, they can give you advice and might prescribe medicines. If you want to improve your diet or get more physical activity, they can help you with this, too.   Lab tests, if needed - The tests you get will depend on your age and situation. For example, your doctor might want to check your:   Cholesterol   Blood sugar   Iron level   Vaccines - The recommended vaccines will depend on your age, health, and what vaccines you already had. Vaccines are very important because they can prevent certain serious or deadly infections.   Discussion of screening - \"Screening\" means checking for diseases or other health problems before they cause symptoms. Your doctor can recommend screening based on your age, risk, and preferences. This might include tests to check for:   Cancer, such as breast, prostate, cervical, " ovarian, colorectal, prostate, lung, or skin cancer   Sexually transmitted infections, such as chlamydia and gonorrhea   Mental health conditions like depression and anxiety  Your doctor will talk to you about the different types of screening tests. They can help you decide which screenings to have. They can also explain what the results might mean.   Answering questions - The physical is a good time to ask the doctor or nurse questions about your health. If needed, they can refer you to other doctors or specialists, too.  Adults older than 65 years often need other care, too. As you get older, your doctor will talk to you about:   How to prevent falling at home   Hearing or vision tests   Memory testing   How to take your medicines safely   Making sure that you have the help and support you need at home  All topics are updated as new evidence becomes available and our peer review process is complete.  This topic retrieved from [employer] on: May 02, 2024.  Topic 712045 Version 1.0  Release: 32.4.3 - C32.122  © 2024 [employer] and/or its affiliates. All rights reserved.  Consumer Information Use and Disclaimer   Disclaimer: This generalized information is a limited summary of diagnosis, treatment, and/or medication information. It is not meant to be comprehensive and should be used as a tool to help the user understand and/or assess potential diagnostic and treatment options. It does NOT include all information about conditions, treatments, medications, side effects, or risks that may apply to a specific patient. It is not intended to be medical advice or a substitute for the medical advice, diagnosis, or treatment of a health care provider based on the health care provider's examination and assessment of a patient's specific and unique circumstances. Patients must speak with a health care provider for complete information about their health, medical questions, and treatment options, including any risks or  benefits regarding use of medications. This information does not endorse any treatments or medications as safe, effective, or approved for treating a specific patient. [employer] Inc[employer] and its affiliates disclaim any warranty or liability relating to this information or the use thereof.The use of this information is governed by the Terms of Use, available at https://www.[employer].com/en/know/clinical-effectiveness-terms. 2024© [employer], Inc. and its affiliates and/or licensors. All rights reserved.  Copyright   © 2024 [employer] and/or its affiliates. All rights reserved.

## 2025-03-07 NOTE — ASSESSMENT & PLAN NOTE
Use xerform to cover wound  Keep open to air when not active (like sitting on the couch watching TV)   Cover when sleeping and at work     Orders:  •  cephalexin (KEFLEX) 500 mg capsule; Take 1 capsule (500 mg total) by mouth 2 (two) times a day for 7 days

## 2025-03-07 NOTE — PROGRESS NOTES
Adult Annual Physical  Name: Caitlin Jarrett      : 1970      MRN: [id number]  Encounter Provider: ZAHRAA Knowles  Encounter Date: 3/7/2025   Encounter department: [facility]    Assessment & Plan  Screening for cervical cancer    Orders:  •  Ambulatory referral to Obstetrics / Gynecology; Future    Encounter for screening mammogram for breast cancer    Orders:  •  Mammo screening bilateral w 3d and cad; Future    Smoking greater than 20 pack years    Orders:  •  CT lung screening program; Future    Annual physical exam    Orders:  •  CBC and differential; Future  •  Comprehensive metabolic panel; Future  •  CBC and differential; Future  •  Comprehensive metabolic panel; Future    Raynaud's phenomenon without gangrene         Vitamin D deficiency    Orders:  •  Vitamin D 25 hydroxy; Future  •  Vitamin D 25 hydroxy; Future    Screening for lung cancer  I discussed with her that she is a candidate for lung cancer CT screening.     The following Shared Decision-Making points were covered:  Benefits of screening were discussed, including the rates of reduction in death from lung cancer and other causes.  Harms of screening were reviewed, including false positive tests, radiation exposure levels, risks of invasive procedures, risks of complications of screening, and risk of overdiagnosis.  I counseled on the importance of adherence to annual lung cancer LDCT screening, impact of co-morbidities, and ability or willingness to undergo diagnosis and treatment.  I counseled on the importance of maintaining abstinence as a former smoker or was counseled on the importance of smoking cessation if a current smoker    Review of Eligibility Criteria: She meets all of the criteria for Lung Cancer Screening.   She is 54 y.o.   She has 20 pack year tobacco history and is a current smoker or has quit within the past 15 years  She presents no signs or symptoms of lung cancer    After  discussion, the patient decided to elect lung cancer screening.       Breast cancer screening by mammogram         Moderate episode of recurrent major depressive disorder (HCC)         Primary hypertension    Orders:  •  irbesartan (AVAPRO) 150 mg tablet; Take 1 tablet (150 mg total) by mouth daily at bedtime    Prediabetes    Orders:  •  Hemoglobin A1C; Future  •  Hemoglobin A1C; Future    Impaired fasting glucose    Orders:  •  Hemoglobin A1C; Future  •  Hemoglobin A1C; Future    On long term drug therapy    Orders:  •  Hemoglobin A1C; Future  •  Lipid Panel with Direct LDL reflex; Future  •  Vitamin D 25 hydroxy; Future  •  Hemoglobin A1C; Future  •  Lipid Panel with Direct LDL reflex; Future  •  Vitamin D 25 hydroxy; Future    Pure hypercholesterolemia    Orders:  •  Lipid Panel with Direct LDL reflex; Future  •  Lipid Panel with Direct LDL reflex; Future    Superficial burn of right wrist, initial encounter  Use xerform to cover wound  Keep open to air when not active (like sitting on the couch watching TV)   Cover when sleeping and at work     Orders:  •  cephalexin (KEFLEX) 500 mg capsule; Take 1 capsule (500 mg total) by mouth 2 (two) times a day for 7 days    Opioid dependence, uncomplicated (HCC)  Continue tramadol  Will return for urine drug screen at a later date - after hours and weekend - unable to send       This patient has a Toxicology test scheduled for his/her appointment.   Patient agrees to provide an Oral or Urine Sample for Toxicology purposes at any time requested.    Scheduled Medication Review:   The Opioid and Controlled Substance Paperwork was filled out.    This patient was educated on side effects, risks of taking this type of medication which include abuse, misuse, dependence, addiction and tolerance.    The Risk Benefit Disclosure includes:  ??The increased risk of respratory depression and death with opioids and controlled substance use has been discussed along with the benefits of  opioid and controlled substance use.   This is includes the marked increase in respiratory depression and death when used in combination with benzodlazepine medications, muscle relaxers, sedatives, MAOls, illicit drugs, and alcohol.  ???The risk of opioid and controlled substance addiction as well as the proper prescribed use of opioids and controlled substances been discussed.  This includes instruction to lock away and secure opioids and controlled substances from the reach of children and pets and not to share, sell, or distribute prescribed controlled substances to anyone else as such behavior will result in discontinuation of opiold therapy and controlled substances.  ??The patient has been advised that discontinuation of alcohol use is a requirement of initiation and continuation of opioid therapy and controlled substance therapy.  ???The risks of dizziness, drowsiness and motor impairment been discussed with the patient with respect to opioid and controlled substance use, and the patlent been advised not to drive or operate any heavy machinery or engage in any safety sensitive tasks while using opioids or controlled substances.  All questions were answered including different dosing levels, increasing and decreasing of of this medication.  We discussed their continued open discussions with the PCP in order to make sure that they are on the correct dose.     We reviewed the potential side effects of the medications including, but are not limited to, constipation, diarrhea, nausea/upset stomach, drowsiness, fatigue, dizziness, urinary retention, visual changes, insomnia, headaches, nightmares, slowed breathing while sleeping, UTI issues, impaired judgment, addiction issues and the risk of fatal overdose if not taken as prescribed.   We discussed the importance of notifying the office/provider immediately of any side effects.   We discussed the importance of immediate notification if there are any feelings of  suicidality thoughts or behaviors   We discussed the importance of contacting the office if he has any tachycardia or fainting situations.   We discussed that sharing medications is a felony.   We discussed other side effects such as QT prolongation, risks of Myocardial Infarction (heart attack), stroke and sudden death.   We discussed immediate notification if there is any seizure-like activity.    We discussed issues with angioedema as an anaphylactic reactions.      I have personally reviewed the [place] Drug Monitoring Program (PDMP) website prior to prescribing this medication and they have been found to be appropriate for this medication.  At this point in time, the patient is showing no signs of addiction, abuse, diversion or suicidal ideation.  The patient's use has been found to be appropriate without any concerns for misuse by the patient.   The patient's current conditions require continued scheduled medication use at this time.   Future review for continued appropriate medication use and misuse will continue.      ???The patient been advised to the rationale and requirement of using the PDMP, UDS, Pill Counts, and pain assessment tools to initiate and continue opioid and controlled substance therapy.  The patient has also been advised as to the appropriate way to call in for refill of medication, allowing for a five to seven day time frame for medications refills.    The patient understands and agrees to use these medications only as prescribed. At this point in time, the patient is showing no signs of addiction, abuse, diversion or suicidal ideation.  All the patient's questions were answered to their satisfaction.    This patient has tried both non-drug (yoga, meditation) and drugs that do not contain opioids, barbiturates, benzodiazepines, and stimulants.   The requested drug will be used with other drugs and closely monitored.           Immunizations and preventive care screenings were discussed  with patient today. Appropriate education was printed on patient's after visit summary.    Counseling:  Alcohol/drug use: discussed moderation in alcohol intake, the recommendations for healthy alcohol use, and avoidance of illicit drug use.  Dental Health: discussed importance of regular tooth brushing, flossing, and dental visits.  Injury prevention: discussed safety/seat belts, safety helmets, smoke detectors, carbon monoxide detectors, and smoking near bedding or upholstery.  Sexual health: discussed sexually transmitted diseases, partner selection, use of condoms, avoidance of unintended pregnancy, and contraceptive alternatives.  Exercise: the importance of regular exercise/physical activity was discussed. Recommend exercise 3-5 times per week for at least 30 minutes.       Depression Screening and Follow-up Plan: Patient was screened for depression during today's encounter. They screened negative with a PHQ-9 score of 0.      Tobacco Cessation Counseling: Tobacco cessation counseling was provided. The patient is sincerely urged to quit consumption of tobacco. She is not ready to quit tobacco. Medication options and side effects of medication discussed. Patient refused medication.         History of Present Illness     Adult Annual Physical:  Patient presents for annual physical.     Diet and Physical Activity:  - Diet/Nutrition: well balanced diet.  - Exercise: 1-2 times a week on average.    Depression Screening:    - PHQ-9 Score: 0    General Health:  - Sleep: sleeps well.  - Hearing: normal hearing bilateral ears.  - Vision: no vision problems.  - Dental: regular dental visits.    /GYN Health:  - Follows with GYN: yes.   - History of STDs: no    Advanced Care Planning:  - Has an advanced directive?: no    - Has a durable medical POA?: no    - ACP document given to patient?: no      Review of Systems   Constitutional:  Negative for activity change, chills, fatigue and fever.   HENT:  Negative for  rhinorrhea and sore throat.    Eyes:  Negative for pain.   Respiratory:  Negative for cough and shortness of breath.    Cardiovascular:  Negative for chest pain, palpitations and leg swelling.   Gastrointestinal:  Negative for abdominal pain, constipation, diarrhea, nausea and vomiting.   Genitourinary:  Negative for difficulty urinating, flank pain, frequency and urgency.   Musculoskeletal:  Positive for arthralgias and myalgias. Negative for gait problem and joint swelling.   Skin:  Positive for wound. Negative for color change.   Neurological:  Negative for dizziness, weakness, light-headedness and headaches.   Psychiatric/Behavioral:  Negative for sleep disturbance. The patient is not nervous/anxious.    All other systems reviewed and are negative.    Current Outpatient Medications on File Prior to Visit   Medication Sig Dispense Refill   • Cholecalciferol (Vitamin D) 125 MCG (5000 UT) CAPS Take 5,000 Units by mouth in the morning 90 capsule 3   • ergocalciferol (VITAMIN D2) 50,000 units Take 1 capsule (50,000 Units total) by mouth 2 (two) times a week 56 capsule 0   • promethazine-dextromethorphan (PHENERGAN-DM) 6.25-15 mg/5 mL oral syrup Take 5 mL by mouth 4 (four) times a day as needed for cough 250 mL 0   • sertraline (ZOLOFT) 50 mg tablet Take 1 tablet (50 mg total) by mouth daily 90 tablet 1   • traMADol (Ultram) 50 mg tablet Take 2 tablets (100 mg total) by mouth every 6 (six) hours as needed for moderate pain 180 tablet 0   • [DISCONTINUED] irbesartan (AVAPRO) 150 mg tablet Take 1 tablet (150 mg total) by mouth daily at bedtime 100 tablet 1   • benzonatate (TESSALON PERLES) 100 mg capsule Take 1 capsule (100 mg total) by mouth 3 (three) times a day as needed for cough (Patient not taking: Reported on 3/7/2025) 20 capsule 0   • Diclofenac Sodium (VOLTAREN) 1 % Apply 2 g topically 4 (four) times a day (Patient not taking: Reported on 3/7/2025) 100 g 1   • fluticasone (FLONASE) 50 mcg/act nasal spray SPRAY  "2 SPRAYS INTO EACH NOSTRIL EVERY DAY (Patient not taking: Reported on 3/7/2025) 48 mL 1     No current facility-administered medications on file prior to visit.        Objective   /76   Pulse 71   Temp (!) 96.6 °F (35.9 °C) (Tympanic)   Resp 18   Ht 5' 5\" (1.651 m)   Wt 77.6 kg (171 lb)   SpO2 97%   BMI 28.46 kg/m²     Physical Exam  Vitals and nursing note reviewed.   Constitutional:       General: She is awake. She is not in acute distress.     Appearance: Normal appearance. She is well-developed.   HENT:      Head: Normocephalic and atraumatic.      Nose: Nose normal.      Mouth/Throat:      Mouth: Mucous membranes are moist.   Eyes:      Conjunctiva/sclera: Conjunctivae normal.   Cardiovascular:      Rate and Rhythm: Normal rate and regular rhythm.      Pulses: Normal pulses.      Heart sounds: Normal heart sounds. No murmur heard.  Pulmonary:      Effort: Pulmonary effort is normal. No respiratory distress.      Breath sounds: Normal breath sounds.   Abdominal:      General: Bowel sounds are normal.      Palpations: Abdomen is soft.      Tenderness: There is no abdominal tenderness.   Musculoskeletal:      Cervical back: Neck supple.      Right lower leg: No edema.      Left lower leg: No edema.   Skin:     General: Skin is warm and dry.          Neurological:      Mental Status: She is alert and oriented to person, place, and time.   Psychiatric:         Attention and Perception: Attention normal.         Mood and Affect: Mood is anxious.         Speech: Speech normal.         Behavior: Behavior normal. Behavior is cooperative.         Thought Content: Thought content normal.         Cognition and Memory: Cognition normal.         Judgment: Judgment normal.       Administrative Statements   I have spent a total time of 45 minutes in caring for this patient on the day of the visit/encounter including Diagnostic results, Prognosis, Risks and benefits of tx options, Instructions for management, " Patient and family education, Importance of tx compliance, Risk factor reductions, Impressions, Counseling / Coordination of care, Documenting in the medical record, Reviewing/placing orders in the medical record (including tests, medications, and/or procedures), and Obtaining or reviewing history  .

## 2025-03-07 NOTE — ASSESSMENT & PLAN NOTE
Orders:  •  Hemoglobin A1C; Future  •  Lipid Panel with Direct LDL reflex; Future  •  Vitamin D 25 hydroxy; Future  •  Hemoglobin A1C; Future  •  Lipid Panel with Direct LDL reflex; Future  •  Vitamin D 25 hydroxy; Future

## 2025-05-13 DIAGNOSIS — S62.521D CLOSED DISPLACED FRACTURE OF DISTAL PHALANX OF RIGHT THUMB WITH ROUTINE HEALING: Chronic | ICD-10-CM

## 2025-05-14 RX ORDER — TRAMADOL HYDROCHLORIDE 50 MG/1
100 TABLET ORAL EVERY 6 HOURS PRN
Qty: 180 TABLET | Refills: 0 | Status: SHIPPED | OUTPATIENT
Start: 2025-05-14

## 2025-05-28 ENCOUNTER — TELEMEDICINE (OUTPATIENT)
Dept: FAMILY MEDICINE CLINIC | Facility: CLINIC | Age: 55
End: 2025-05-28
Payer: COMMERCIAL

## 2025-05-28 ENCOUNTER — NURSE TRIAGE (OUTPATIENT)
Age: 55
End: 2025-05-28

## 2025-05-28 DIAGNOSIS — K04.7 DENTAL INFECTION: Primary | ICD-10-CM

## 2025-05-28 PROCEDURE — 99213 OFFICE O/P EST LOW 20 MIN: CPT

## 2025-05-28 NOTE — PROGRESS NOTES
Virtual Regular Visit  Name: Caitlin Jarrett      : 1970      MRN: [id number]  Encounter Provider: Kelli Llamas PA-C  Encounter Date: 2025   Encounter department: [facility]  :  Assessment & Plan  Dental infection  -Prescribed Augmentin for 5 more days  -Advised patient to take antibiotic with food and probiotic (Activia) to avoid GI upset  -Advised patient to finish antibiotics even if feeling better   -Patient welcome to come back at any time if not feeling better/worsening   - Encouraged her to follow-up with a dentist.  Patient states she has not had the time but she will get to it.  - Gave ER precautions  -Advised if infection were to get worse or not healing to let us know.    Orders:    amoxicillin-clavulanate (AUGMENTIN) 875-125 mg per tablet; Take 1 tablet by mouth every 12 (twelve) hours for 5 days                 History of Present Illness     Patient is a 55-year-old female who presents today for tooth infection.  Patient states that she has had some pain, swelling, warmth on the right side of her cheek that has radiated to her jaw and ear.  Patient has had tooth abscesses and infections in the past and states that this feels similar.  Patient did have 3 Augmentin left at home which she started taking.  Patient states that the swelling is down, pain is less but she only has 1 left and knows she will need more.  Patient has been using over-the-counter pain relievers for pain which have helped.  Currently does not see a dentist      Review of Systems   Constitutional:  Negative for chills, fatigue and fever.   HENT:  Positive for dental problem and facial swelling. Negative for congestion, ear pain and sore throat.    Eyes:  Negative for pain.   Respiratory:  Negative for cough and shortness of breath.    Cardiovascular:  Negative for chest pain and palpitations.   Gastrointestinal:  Negative for abdominal pain, constipation, diarrhea, nausea and vomiting.    Genitourinary:  Negative for dysuria and hematuria.   Musculoskeletal:  Negative for arthralgias and back pain.   Skin:  Negative for color change and rash.   Neurological:  Negative for syncope, numbness and headaches.   All other systems reviewed and are negative.      Objective   There were no vitals taken for this visit.    Physical Exam  Vitals and nursing note reviewed.   Constitutional:       General: She is not in acute distress.     Appearance: Normal appearance. She is well-developed.   HENT:      Head: Normocephalic and atraumatic.     Eyes:      Conjunctiva/sclera: Conjunctivae normal.     Pulmonary:      Effort: Pulmonary effort is normal.     Musculoskeletal:      Cervical back: Normal range of motion.     Neurological:      Mental Status: She is alert and oriented to person, place, and time.     Psychiatric:         Mood and Affect: Mood normal.         Behavior: Behavior normal.         Thought Content: Thought content normal.         Judgment: Judgment normal.         Administrative Statements   Encounter provider Kelli Llamas PA-C    The Patient is located at Home and in the following state in which I hold an active license [place].    The patient was identified by name and date of birth. Caitlin Jarrett was informed that this is a telemedicine visit and that the visit is being conducted through the Epic Embedded platform. She agrees to proceed..  My office door was closed. No one else was in the room.  She acknowledged consent and understanding of privacy and security of the video platform. The patient has agreed to participate and understands they can discontinue the visit at any time.    I have spent a total time of 20 minutes in caring for this patient on the day of the visit/encounter including Diagnostic results, Prognosis, Risks and benefits of tx options, Instructions for management, Patient and family education, Importance of tx compliance, Risk factor reductions, Impressions, Counseling /  Coordination of care, Documenting in the medical record, Reviewing/placing orders in the medical record (including tests, medications, and/or procedures), and Obtaining or reviewing history  , not including the time spent for establishing the audio/video connection.

## 2025-05-28 NOTE — TELEPHONE ENCOUNTER
"REASON FOR CONVERSATION: Dental Pain    SYMPTOMS: left lower side of mouth tooth ache. No swelling or fever present.     OTHER HEALTH INFORMATION: Patient started taking old prescription of amoxicillin over weekend and stated it has helped pain and swelling. She is requesting another prescription be called into pharmacy to continue. Currently does not see a dentist.    PROTOCOL DISPOSITION: Call Dentist Today    CARE ADVICE PROVIDED: Warm called office discussed patient request, asked if Virtual is appropriate. Scheduled virtual appointment with Kelli today at 1130 am.     PRACTICE FOLLOW-UP: Patient is locked out of My Chart, Phone number for virtual visit in notes on appointment desk to text link.     Reason for Disposition   Toothache present > 24 hours    Answer Assessment - Initial Assessment Questions  1. LOCATION: \"Which tooth is hurting?\"  (e.g., right-side/left-side, upper/lower, front/back)      Left side back tooth bottom    2. ONSET: \"When did the toothache start?\"  (e.g., hours, days)       Started over the weekend  3. SEVERITY: \"How bad is the toothache?\"  (Scale 1-10; mild, moderate or severe)      States it is getting better pain is mild she has been taking old antibiotics ordered previously    4. SWELLING: \"Is there any visible swelling of your face?\"      When it started face was swollen, however is not currently swollen per patient.    5. OTHER SYMPTOMS: \"Do you have any other symptoms?\" (e.g., fever)      No fever, no dizziness lightheadedness or earaches.    6. PREGNANCY: \"Is there any chance you are pregnant?\" \"When was your last menstrual period?\"      No    Protocols used: Toothache-Adult-[place]    "

## 2025-07-08 ENCOUNTER — OFFICE VISIT (OUTPATIENT)
Dept: FAMILY MEDICINE CLINIC | Facility: CLINIC | Age: 55
End: 2025-07-08
Payer: COMMERCIAL

## 2025-07-08 VITALS
HEIGHT: 65 IN | SYSTOLIC BLOOD PRESSURE: 130 MMHG | RESPIRATION RATE: 16 BRPM | BODY MASS INDEX: 27.99 KG/M2 | TEMPERATURE: 97.6 F | DIASTOLIC BLOOD PRESSURE: 82 MMHG | HEART RATE: 72 BPM | WEIGHT: 168 LBS | OXYGEN SATURATION: 99 %

## 2025-07-08 DIAGNOSIS — F33.1 MODERATE EPISODE OF RECURRENT MAJOR DEPRESSIVE DISORDER (HCC): ICD-10-CM

## 2025-07-08 DIAGNOSIS — I10 PRIMARY HYPERTENSION: Chronic | ICD-10-CM

## 2025-07-08 DIAGNOSIS — N30.01 ACUTE CYSTITIS WITH HEMATURIA: Primary | ICD-10-CM

## 2025-07-08 PROBLEM — R39.9 UTI SYMPTOMS: Status: ACTIVE | Noted: 2025-07-08

## 2025-07-08 PROBLEM — R39.9 UTI SYMPTOMS: Chronic | Status: ACTIVE | Noted: 2025-07-08

## 2025-07-08 LAB
SL AMB  POCT GLUCOSE, UA: NEGATIVE
SL AMB LEUKOCYTE ESTERASE,UA: NEGATIVE
SL AMB POCT BILIRUBIN,UA: NEGATIVE
SL AMB POCT BLOOD,UA: ABNORMAL
SL AMB POCT CLARITY,UA: CLEAR
SL AMB POCT COLOR,UA: YELLOW
SL AMB POCT KETONES,UA: NEGATIVE
SL AMB POCT NITRITE,UA: NEGATIVE
SL AMB POCT PH,UA: 6
SL AMB POCT SPECIFIC GRAVITY,UA: 1.01
SL AMB POCT URINE PROTEIN: NEGATIVE
SL AMB POCT UROBILINOGEN: 0.2

## 2025-07-08 PROCEDURE — 99212 OFFICE O/P EST SF 10 MIN: CPT | Performed by: NURSE PRACTITIONER

## 2025-07-08 PROCEDURE — 87086 URINE CULTURE/COLONY COUNT: CPT | Performed by: NURSE PRACTITIONER

## 2025-07-08 PROCEDURE — 81002 URINALYSIS NONAUTO W/O SCOPE: CPT | Performed by: NURSE PRACTITIONER

## 2025-07-08 RX ORDER — IRBESARTAN 150 MG/1
150 TABLET ORAL
Qty: 90 TABLET | Refills: 3 | Status: SHIPPED | OUTPATIENT
Start: 2025-07-08

## 2025-07-08 RX ORDER — IRBESARTAN 150 MG/1
150 TABLET ORAL DAILY
Qty: 14 TABLET | Refills: 0 | Status: SHIPPED | OUTPATIENT
Start: 2025-07-08 | End: 2025-07-22

## 2025-07-08 RX ORDER — CIPROFLOXACIN 500 MG/1
500 TABLET, FILM COATED ORAL EVERY 12 HOURS SCHEDULED
Qty: 20 TABLET | Refills: 0 | Status: SHIPPED | OUTPATIENT
Start: 2025-07-08 | End: 2025-07-18

## 2025-07-08 RX ORDER — FLUCONAZOLE 100 MG/1
100 TABLET ORAL DAILY
Qty: 10 TABLET | Refills: 0 | Status: SHIPPED | OUTPATIENT
Start: 2025-07-08 | End: 2025-07-18

## 2025-07-08 NOTE — ASSESSMENT & PLAN NOTE
7/7/2025  Right flank pain  7/8/2025  Left flank pain  Positive blood   We will start her on Cipro and Flagyl      Orders:    ciprofloxacin (CIPRO) 500 mg tablet; Take 1 tablet (500 mg total) by mouth every 12 (twelve) hours for 10 days    fluconazole (DIFLUCAN) 100 mg tablet; Take 1 tablet (100 mg total) by mouth daily for 10 days

## 2025-07-08 NOTE — PROGRESS NOTES
"Name: Caitlin Jarrett      : 1970      MRN: [id number]  Encounter Provider: ZAHRAA Konwles  Encounter Date: 2025   Encounter department: [facility]  :  Assessment & Plan  Acute cystitis with hematuria  2025  Right flank pain  2025  Left flank pain  Positive blood   We will start her on Cipro and Flagyl      Orders:    ciprofloxacin (CIPRO) 500 mg tablet; Take 1 tablet (500 mg total) by mouth every 12 (twelve) hours for 10 days    fluconazole (DIFLUCAN) 100 mg tablet; Take 1 tablet (100 mg total) by mouth daily for 10 days    Primary hypertension    Orders:    irbesartan (AVAPRO) 150 mg tablet; Take 1 tablet (150 mg total) by mouth daily at bedtime    irbesartan (AVAPRO) 150 mg tablet; Take 1 tablet (150 mg total) by mouth daily for 14 days    Moderate episode of recurrent major depressive disorder (HCC)      Orders:    sertraline (ZOLOFT) 50 mg tablet; Take 1 tablet (50 mg total) by mouth daily           History of Present Illness   The patient is here today to discuss her UTI symptoms.  Patient has had frequency and urgency with some stinging sensation.  She has had positive UTIs in the past.   The patient voiced their understanding and agreement.   Follow up as scheduled.  Please continue to the PORCH section of the note for details of today's visit.             Review of Systems   Genitourinary:  Positive for dysuria, frequency and urgency.       Objective   /82 (BP Location: Left arm, Patient Position: Sitting, Cuff Size: Large)   Pulse 72   Temp 97.6 °F (36.4 °C) (Tympanic)   Resp 16   Ht 5' 5\" (1.651 m)   Wt 76.2 kg (168 lb)   SpO2 99%   BMI 27.96 kg/m²      Physical Exam  Genitourinary:     Comments: Frequency, urgency, stinging sensation      Administrative Statements   I have spent a total time of 15 minutes in caring for this patient on the day of the visit/encounter including Diagnostic results, Prognosis, Risks and benefits of tx " options, Instructions for management, Patient and family education, Importance of tx compliance, Risk factor reductions, Impressions, Counseling / Coordination of care, Documenting in the medical record, Reviewing/placing orders in the medical record (including tests, medications, and/or procedures), and Obtaining or reviewing history  .

## 2025-07-08 NOTE — ASSESSMENT & PLAN NOTE
Orders:    irbesartan (AVAPRO) 150 mg tablet; Take 1 tablet (150 mg total) by mouth daily at bedtime    irbesartan (AVAPRO) 150 mg tablet; Take 1 tablet (150 mg total) by mouth daily for 14 days

## 2025-07-09 LAB — BACTERIA UR CULT: NORMAL

## 2025-07-24 ENCOUNTER — VBI (OUTPATIENT)
Dept: ADMINISTRATIVE | Facility: OTHER | Age: 55
End: 2025-07-24

## 2025-07-24 NOTE — TELEPHONE ENCOUNTER
07/24/25 3:05 PM     Chart reviewed for Pap Smear (HPV) aka Cervical Cancer Screening was/were not submitted to the patient's insurance.     Sherin Osorio MA   [facility]

## 2025-08-01 DIAGNOSIS — E55.9 VITAMIN D DEFICIENCY: Chronic | ICD-10-CM

## 2025-08-01 RX ORDER — ERGOCALCIFEROL 1.25 MG/1
CAPSULE, LIQUID FILLED ORAL
Qty: 24 CAPSULE | Refills: 2 | Status: SHIPPED | OUTPATIENT
Start: 2025-08-01